# Patient Record
Sex: FEMALE | Race: BLACK OR AFRICAN AMERICAN | HISPANIC OR LATINO | URBAN - METROPOLITAN AREA
[De-identification: names, ages, dates, MRNs, and addresses within clinical notes are randomized per-mention and may not be internally consistent; named-entity substitution may affect disease eponyms.]

---

## 2017-06-15 ENCOUNTER — INPATIENT (INPATIENT)
Facility: HOSPITAL | Age: 27
LOS: 1 days | Discharge: HOME | End: 2017-06-17
Attending: OBSTETRICS & GYNECOLOGY | Admitting: OBSTETRICS & GYNECOLOGY

## 2017-06-15 DIAGNOSIS — O99.89 OTHER SPECIFIED DISEASES AND CONDITIONS COMPLICATING PREGNANCY, CHILDBIRTH AND THE PUERPERIUM: ICD-10-CM

## 2017-06-28 DIAGNOSIS — D64.9 ANEMIA, UNSPECIFIED: ICD-10-CM

## 2017-06-28 DIAGNOSIS — Z33.1 PREGNANT STATE, INCIDENTAL: ICD-10-CM

## 2017-06-28 DIAGNOSIS — J45.909 UNSPECIFIED ASTHMA, UNCOMPLICATED: ICD-10-CM

## 2017-06-28 DIAGNOSIS — Z3A.39 39 WEEKS GESTATION OF PREGNANCY: ICD-10-CM

## 2019-02-25 ENCOUNTER — APPOINTMENT (OUTPATIENT)
Dept: OTOLARYNGOLOGY | Facility: CLINIC | Age: 29
End: 2019-02-25

## 2019-05-06 ENCOUNTER — EMERGENCY (EMERGENCY)
Facility: HOSPITAL | Age: 29
LOS: 0 days | Discharge: HOME | End: 2019-05-06
Attending: EMERGENCY MEDICINE | Admitting: EMERGENCY MEDICINE
Payer: MEDICAID

## 2019-05-06 VITALS
HEART RATE: 95 BPM | TEMPERATURE: 97 F | SYSTOLIC BLOOD PRESSURE: 150 MMHG | RESPIRATION RATE: 18 BRPM | OXYGEN SATURATION: 100 % | DIASTOLIC BLOOD PRESSURE: 83 MMHG

## 2019-05-06 DIAGNOSIS — Z91.018 ALLERGY TO OTHER FOODS: ICD-10-CM

## 2019-05-06 DIAGNOSIS — R51 HEADACHE: ICD-10-CM

## 2019-05-06 DIAGNOSIS — Z91.013 ALLERGY TO SEAFOOD: ICD-10-CM

## 2019-05-06 PROCEDURE — 70450 CT HEAD/BRAIN W/O DYE: CPT | Mod: 26

## 2019-05-06 PROCEDURE — 99284 EMERGENCY DEPT VISIT MOD MDM: CPT

## 2019-05-06 RX ORDER — METOCLOPRAMIDE HCL 10 MG
10 TABLET ORAL ONCE
Qty: 0 | Refills: 0 | Status: COMPLETED | OUTPATIENT
Start: 2019-05-06 | End: 2019-05-06

## 2019-05-06 RX ORDER — ACETAMINOPHEN 500 MG
650 TABLET ORAL ONCE
Qty: 0 | Refills: 0 | Status: COMPLETED | OUTPATIENT
Start: 2019-05-06 | End: 2019-05-06

## 2019-05-06 RX ADMIN — Medication 650 MILLIGRAM(S): at 18:49

## 2019-05-06 RX ADMIN — Medication 10 MILLIGRAM(S): at 18:50

## 2019-05-06 NOTE — ED PROVIDER NOTE - PROGRESS NOTE DETAILS
Patient states she is feeling significantly better after medication. Still awaiting head CT. HA Resolved. CT neg . f/u with neuro I personally evaluated the patient. I reviewed the Physician Assistant Fellow's note and agree with the findings and plan.

## 2019-05-06 NOTE — ED PROVIDER NOTE - CARE PROVIDER_API CALL
Jesse Zhang)  EEGEpilepsy; Neurology  [address] [place] [address], [address]  [place] [address]  Phone: [phone]  Fax: [phone]  Follow Up Time:

## 2019-05-06 NOTE — ED ADULT NURSE NOTE - NSIMPLEMENTINTERV_GEN_ALL_ED
Implemented All Universal Safety Interventions:  [place] to call system. Call bell, personal items and telephone within reach. Instruct patient to call for assistance. Room bathroom lighting operational. Non-slip footwear when patient is off stretcher. Physically safe environment: no spills, clutter or unnecessary equipment. Stretcher in lowest position, wheels locked, appropriate side rails in place.
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
## 2019-05-06 NOTE — ED PROVIDER NOTE - PHYSICAL EXAMINATION
Physical Exam    Vital Signs: I have reviewed the initial vital signs  Constitutional: well-nourished, appears stated age, no acute distress  Head: Normocephalic, atraumatic.  EENT: Conjunctiva pink, Sclera clear, PERRLA, EOMI, painless, slight horizontal nystagmus noted.  Cardiovascular: S1 and S2 present, regular rate, regular rhythm. Well perfused extremities, no peripheral edema  Respiratory: no audible wheezing  Musculoskeletal: supple nontender neck, no midline tenderness, no joint pain  Integumentary: warm, dry, no rash  Neurologic: A & O x 3, CN II-XII grossly intact, all extremities’ motor and sensory functions grossly intact. Normal gait, no ataxia  Psychiatric: appropriate mood, appropriate affect

## 2019-05-06 NOTE — ED PROVIDER NOTE - CLINICAL SUMMARY MEDICAL DECISION MAKING FREE TEXT BOX
pw headache. Imaging negative and pain controlled in ED. Patient to be discharged from ED. Any available test results were discussed with patient and/or family. Verbal instructions given, including instructions to return to ED immediately for any new, worsening, or concerning symptoms. Patient endorsed understanding. Written discharge instructions additionally given, including follow-up plan with neurologist and PCP.

## 2019-05-06 NOTE — ED ADULT NURSE NOTE - ED STAT RN HANDOFF DETAILS
pt denies discomfort, verbalized understanding discharge instructions and willingness for DC. iv removed. VSS.

## 2019-05-06 NOTE — ED PROVIDER NOTE - NS ED ROS FT
Review of Systems         Constitutional: (-) fever (-) chills (-) weakness       Head: (-) trauma       EENT: (-) visual changes       Cardiovascular: (-) chest pain       Respiratory: (-) cough, (-) shortness of breath       Gastrointestinal: (-) abdominal pain (-) vomiting (-) diarrhea (-) nausea (-) constipation       Genitourinary: (-) dysuria       Musculoskeletal: (-) neck pain (-) back pain (-) joint pain       Integumentary: (-) rash       Neurological: (+) headache (-) altered mental status (-) dizziness (-) paresthesias       Psych: (-) psych history

## 2019-05-06 NOTE — ED ADULT NURSE REASSESSMENT NOTE - NS ED NURSE REASSESS COMMENT FT1
pt co pain above right eye. no distress noted at this time. iv intact, safety maintained, VSS. awaiting additional testing.

## 2019-05-06 NOTE — ED PROVIDER NOTE - OBJECTIVE STATEMENT
28 year old female hx of migraines presenting with 3 days of migraine. Not maximal at onset, worsening. Bitemporal and over left eye. No palliative measures, has not taken any medications but Excedrin usually works. No nausea/vomiting/visual changes. Does admit to intermittent dizziness. She is sent in by her PCP for eval. Denies weakness, numbness, paresthesias. + photophobia. No fevers, chills, neck pain, shortness of breath, cough. Denies any head trauma. Patient does have history several years ago of head injury.

## 2019-05-06 NOTE — ED PROVIDER NOTE - CARE PROVIDERS DIRECT ADDRESSES
[email or web address]

## 2019-06-06 ENCOUNTER — LABORATORY RESULT (OUTPATIENT)
Age: 29
End: 2019-06-06

## 2019-06-06 ENCOUNTER — OUTPATIENT (OUTPATIENT)
Dept: OUTPATIENT SERVICES | Facility: HOSPITAL | Age: 29
LOS: 1 days | Discharge: HOME | End: 2019-06-06

## 2019-06-06 ENCOUNTER — APPOINTMENT (OUTPATIENT)
Dept: RHEUMATOLOGY | Facility: CLINIC | Age: 29
End: 2019-06-06

## 2019-06-06 VITALS
HEART RATE: 92 BPM | TEMPERATURE: 96.7 F | WEIGHT: 205 LBS | HEIGHT: 69 IN | BODY MASS INDEX: 30.36 KG/M2 | SYSTOLIC BLOOD PRESSURE: 149 MMHG | DIASTOLIC BLOOD PRESSURE: 87 MMHG

## 2019-06-06 DIAGNOSIS — R51 HEADACHE: ICD-10-CM

## 2019-06-06 DIAGNOSIS — R63.5 ABNORMAL WEIGHT GAIN: ICD-10-CM

## 2019-06-06 RX ORDER — PANTOPRAZOLE SODIUM 40 MG/1
40 TABLET, DELAYED RELEASE ORAL
Refills: 0 | Status: ACTIVE | COMMUNITY
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
## 2019-06-06 NOTE — ASSESSMENT
[FreeTextEntry1] : 29F w PMHx of scoliosis, migraines, vertigo, asthma and food allergies (shellfish, tomato, mushrooms, chocolate, cats with anaphylaxis) referred by PMD for workup of GCA and temporal artery biopsy. \par \par Headache with vision changes: Doubt GCA given age and only minimally elevated ESR (33) CRP (1.7), MAGNOLIA neg, Factor XII slightly elevated\par - slow taper of prednisone now\par - F/u with neurology and PMD\par - Ophthalmology referral: has only seen optometrist\par - Patient has carotid duplex, EMG already scheduled

## 2019-06-06 NOTE — REVIEW OF SYSTEMS
[Recent Weight Gain (___ Lbs)] : recent [unfilled] ~Ulb weight gain [Negative] : Psychiatric [As Noted in HPI] : as noted in HPI [Confused] : no confusion [Dizziness] : no dizziness [Convulsions] : no convulsions [Fainting] : no fainting [Limb Weakness] : no limb weakness [FreeTextEntry2] : 40-50 lbs in 4-5 months [FreeTextEntry4] : Denies jaw claudication

## 2019-06-06 NOTE — PHYSICAL EXAM
[General Appearance - Alert] : alert [General Appearance - In No Acute Distress] : in no acute distress [General Appearance - Well Nourished] : well nourished [Sclera] : the sclera and conjunctiva were normal [Extraocular Movements] : extraocular movements were intact [Outer Ear] : the ears and nose were normal in appearance [Neck Appearance] : the appearance of the neck was normal [] : no respiratory distress [Respiration, Rhythm And Depth] : normal respiratory rhythm and effort [Heart Rate And Rhythm] : heart rate was normal and rhythm regular [Abdomen Soft] : soft [Abdomen Tenderness] : non-tender [No CVA Tenderness] : no ~M costovertebral angle tenderness [Involuntary Movements] : no involuntary movements were seen [Skin Color & Pigmentation] : normal skin color and pigmentation [No Focal Deficits] : no focal deficits [Musculoskeletal - Swelling] : no joint swelling seen [Oriented To Time, Place, And Person] : oriented to person, place, and time [FreeTextEntry1] : NO scalp tenderness
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
## 2019-07-18 ENCOUNTER — APPOINTMENT (OUTPATIENT)
Dept: RHEUMATOLOGY | Facility: CLINIC | Age: 29
End: 2019-07-18

## 2019-08-01 LAB
ACE BLD-CCNC: 23 U/L
ALBUMIN SERPL ELPH-MCNC: 4.3 G/DL
ALP BLD-CCNC: 77 U/L
ALT SERPL-CCNC: 9 U/L
ANION GAP SERPL CALC-SCNC: 11 MMOL/L
AST SERPL-CCNC: 6 U/L
BASOPHILS # BLD AUTO: 0 K/UL
BASOPHILS NFR BLD AUTO: 0 %
BILIRUB SERPL-MCNC: 0.3 MG/DL
BUN SERPL-MCNC: 12 MG/DL
CALCIUM SERPL-MCNC: 9.3 MG/DL
CHLORIDE SERPL-SCNC: 104 MMOL/L
CO2 SERPL-SCNC: 27 MMOL/L
CREAT SERPL-MCNC: 0.8 MG/DL
CRP SERPL-MCNC: 0.64 MG/DL
EOSINOPHIL # BLD AUTO: 0.18 K/UL
EOSINOPHIL NFR BLD AUTO: 1 %
ERYTHROCYTE [SEDIMENTATION RATE] IN BLOOD BY WESTERGREN METHOD: 18 MM/HR
GLUCOSE SERPL-MCNC: 81 MG/DL
HCT VFR BLD CALC: 34.8 %
HGB BLD-MCNC: 11.3 G/DL
LYMPHOCYTES # BLD AUTO: 6.59 K/UL
LYMPHOCYTES NFR BLD AUTO: 36 %
MAN DIFF?: NORMAL
MCHC RBC-ENTMCNC: 27.3 PG
MCHC RBC-ENTMCNC: 32.5 G/DL
MCV RBC AUTO: 84.1 FL
MONOCYTES # BLD AUTO: 0.55 K/UL
MONOCYTES NFR BLD AUTO: 3 %
NEUTROPHILS # BLD AUTO: 10.99 K/UL
NEUTROPHILS NFR BLD AUTO: 59 %
PLATELET # BLD AUTO: 377 K/UL
POTASSIUM SERPL-SCNC: 4.4 MMOL/L
PROT SERPL-MCNC: 6.9 G/DL
RBC # BLD: 4.14 M/UL
RBC # FLD: 16.7 %
SODIUM SERPL-SCNC: 142 MMOL/L
T3 SERPL-MCNC: 105 NG/DL
TSH SERPL-ACNC: 1.7 UIU/ML
WBC # FLD AUTO: 18.31 K/UL

## 2020-10-27 ENCOUNTER — OUTPATIENT (OUTPATIENT)
Dept: OUTPATIENT SERVICES | Facility: HOSPITAL | Age: 30
LOS: 1 days | Discharge: HOME | End: 2020-10-27

## 2020-10-27 VITALS — SYSTOLIC BLOOD PRESSURE: 129 MMHG | DIASTOLIC BLOOD PRESSURE: 71 MMHG | HEART RATE: 107 BPM

## 2020-10-27 VITALS — HEART RATE: 109 BPM | DIASTOLIC BLOOD PRESSURE: 83 MMHG | SYSTOLIC BLOOD PRESSURE: 139 MMHG

## 2020-10-27 LAB
ALBUMIN SERPL ELPH-MCNC: 3.7 G/DL — SIGNIFICANT CHANGE UP (ref 3.5–5.2)
ALP SERPL-CCNC: 87 U/L — SIGNIFICANT CHANGE UP (ref 30–115)
ALT FLD-CCNC: 10 U/L — SIGNIFICANT CHANGE UP (ref 0–41)
ANION GAP SERPL CALC-SCNC: 10 MMOL/L — SIGNIFICANT CHANGE UP (ref 7–14)
APPEARANCE UR: CLEAR — SIGNIFICANT CHANGE UP
AST SERPL-CCNC: 13 U/L — SIGNIFICANT CHANGE UP (ref 0–41)
BACTERIA # UR AUTO: NEGATIVE — SIGNIFICANT CHANGE UP
BASOPHILS # BLD AUTO: 0.03 K/UL — SIGNIFICANT CHANGE UP (ref 0–0.2)
BASOPHILS NFR BLD AUTO: 0.3 % — SIGNIFICANT CHANGE UP (ref 0–1)
BILIRUB SERPL-MCNC: 0.2 MG/DL — SIGNIFICANT CHANGE UP (ref 0.2–1.2)
BILIRUB UR-MCNC: NEGATIVE — SIGNIFICANT CHANGE UP
BUN SERPL-MCNC: 5 MG/DL — LOW (ref 10–20)
CALCIUM SERPL-MCNC: 8.9 MG/DL — SIGNIFICANT CHANGE UP (ref 8.5–10.1)
CHLORIDE SERPL-SCNC: 105 MMOL/L — SIGNIFICANT CHANGE UP (ref 98–110)
CO2 SERPL-SCNC: 21 MMOL/L — SIGNIFICANT CHANGE UP (ref 17–32)
COLOR SPEC: YELLOW — SIGNIFICANT CHANGE UP
CREAT ?TM UR-MCNC: 196 MG/DL — SIGNIFICANT CHANGE UP
CREAT SERPL-MCNC: 0.6 MG/DL — LOW (ref 0.7–1.5)
DIFF PNL FLD: NEGATIVE — SIGNIFICANT CHANGE UP
EOSINOPHIL # BLD AUTO: 0.12 K/UL — SIGNIFICANT CHANGE UP (ref 0–0.7)
EOSINOPHIL NFR BLD AUTO: 1.1 % — SIGNIFICANT CHANGE UP (ref 0–8)
EPI CELLS # UR: 5 /HPF — SIGNIFICANT CHANGE UP (ref 0–5)
GLUCOSE SERPL-MCNC: 101 MG/DL — HIGH (ref 70–99)
GLUCOSE UR QL: NEGATIVE — SIGNIFICANT CHANGE UP
HCT VFR BLD CALC: 33.1 % — LOW (ref 37–47)
HGB BLD-MCNC: 10.7 G/DL — LOW (ref 12–16)
HYALINE CASTS # UR AUTO: 3 /LPF — SIGNIFICANT CHANGE UP (ref 0–7)
IMM GRANULOCYTES NFR BLD AUTO: 0.5 % — HIGH (ref 0.1–0.3)
KETONES UR-MCNC: NEGATIVE — SIGNIFICANT CHANGE UP
LDH SERPL L TO P-CCNC: 189 — SIGNIFICANT CHANGE UP (ref 50–242)
LEUKOCYTE ESTERASE UR-ACNC: ABNORMAL
LYMPHOCYTES # BLD AUTO: 2.38 K/UL — SIGNIFICANT CHANGE UP (ref 1.2–3.4)
LYMPHOCYTES # BLD AUTO: 21.8 % — SIGNIFICANT CHANGE UP (ref 20.5–51.1)
MCHC RBC-ENTMCNC: 29 PG — SIGNIFICANT CHANGE UP (ref 27–31)
MCHC RBC-ENTMCNC: 32.3 G/DL — SIGNIFICANT CHANGE UP (ref 32–37)
MCV RBC AUTO: 89.7 FL — SIGNIFICANT CHANGE UP (ref 81–99)
MONOCYTES # BLD AUTO: 0.65 K/UL — HIGH (ref 0.1–0.6)
MONOCYTES NFR BLD AUTO: 6 % — SIGNIFICANT CHANGE UP (ref 1.7–9.3)
NEUTROPHILS # BLD AUTO: 7.68 K/UL — HIGH (ref 1.4–6.5)
NEUTROPHILS NFR BLD AUTO: 70.3 % — SIGNIFICANT CHANGE UP (ref 42.2–75.2)
NITRITE UR-MCNC: NEGATIVE — SIGNIFICANT CHANGE UP
NRBC # BLD: 0 /100 WBCS — SIGNIFICANT CHANGE UP (ref 0–0)
PH UR: 6.5 — SIGNIFICANT CHANGE UP (ref 5–8)
PLATELET # BLD AUTO: 316 K/UL — SIGNIFICANT CHANGE UP (ref 130–400)
POTASSIUM SERPL-MCNC: 4 MMOL/L — SIGNIFICANT CHANGE UP (ref 3.5–5)
POTASSIUM SERPL-SCNC: 4 MMOL/L — SIGNIFICANT CHANGE UP (ref 3.5–5)
PROT ?TM UR-MCNC: 21 MG/DLG/24H — SIGNIFICANT CHANGE UP
PROT SERPL-MCNC: 6.5 G/DL — SIGNIFICANT CHANGE UP (ref 6–8)
PROT UR-MCNC: SIGNIFICANT CHANGE UP
PROT/CREAT UR-RTO: 0.1 RATIO — SIGNIFICANT CHANGE UP (ref 0–0.2)
RBC # BLD: 3.69 M/UL — LOW (ref 4.2–5.4)
RBC # FLD: 14.7 % — HIGH (ref 11.5–14.5)
RBC CASTS # UR COMP ASSIST: 4 /HPF — SIGNIFICANT CHANGE UP (ref 0–4)
SODIUM SERPL-SCNC: 136 MMOL/L — SIGNIFICANT CHANGE UP (ref 135–146)
SP GR SPEC: 1.02 — SIGNIFICANT CHANGE UP (ref 1.01–1.03)
URATE SERPL-MCNC: 3 MG/DL — SIGNIFICANT CHANGE UP (ref 2.5–7)
UROBILINOGEN FLD QL: ABNORMAL
WBC # BLD: 10.91 K/UL — HIGH (ref 4.8–10.8)
WBC # FLD AUTO: 10.91 K/UL — HIGH (ref 4.8–10.8)
WBC UR QL: 5 /HPF — SIGNIFICANT CHANGE UP (ref 0–5)

## 2020-10-27 NOTE — OB PROVIDER TRIAGE NOTE - NSHPPHYSICALEXAM_GEN_ALL_CORE
Vital Signs Last 24 Hrs  T(C): 36.9 (27 Oct 2020 11:29), Max: 36.9 (27 Oct 2020 11:29)  T(F): 98.4 (27 Oct 2020 11:29), Max: 98.4 (27 Oct 2020 11:29)  HR: 108 (27 Oct 2020 11:53) (108 - 113)  BP: 142/90 (27 Oct 2020 11:53) (139/83 - 148/92)  RR: 18 (27 Oct 2020 11:29) (18 - 18)    Gen: AOx3, no acute distress  CVS: RRR  Lungs: CTABL  Abd: soft, gravid, non tender, no palpable contractions  Neuro: reflexes 2+ in UE bilaterally  Ext: No edema bilaterally    EFM: 145/mod/+accels; cat 1  [place]: none  SVE: /  /   vertex intact @  Speculum:  BSS: Vital Signs Last 24 Hrs  T(C): 36.9 (27 Oct 2020 11:29), Max: 36.9 (27 Oct 2020 11:29)  T(F): 98.4 (27 Oct 2020 11:29), Max: 98.4 (27 Oct 2020 11:29)  HR: 108 (27 Oct 2020 11:53) (108 - 113)  BP: 142/90 (27 Oct 2020 11:53) (139/83 - 148/92)  RR: 18 (27 Oct 2020 11:29) (18 - 18)    Gen: AOx3, no acute distress  CVS: RRR  Lungs: CTABL  Abd: soft, gravid, non tender, no palpable contractions  Neuro: reflexes 2+ in UE bilaterally  Ext: No edema bilaterally    EFM: 145/mod/+accels; cat 1  [place]: none  SVE: C/L/P  Speculum: cervix appears closed, no lesions, no discharge   BSS: cephalic, ant placenta, CL 3.67cm, BPP 8/8, MVP 7.12cm, EFW 870g (61.2%)

## 2020-10-27 NOTE — OB PROVIDER TRIAGE NOTE - NS_OBGYNHISTORY_OBGYN_ALL_OB_FT
Obhx: FT NSVDx3, largest 4xme0or  P3: hyperemesis gravidarum    Gynhx: Denies h/o fibroids, cysts, abnormal paps, or STIs

## 2020-10-27 NOTE — OB PROVIDER TRIAGE NOTE - ADDITIONAL INSTRUCTIONS
If you have headache, blurry vision, chest pain, difficulty breathing, vaginal bleeding, decreased fetal movement, or severe abdominal pain, call the doctor or return to the hospital.

## 2020-10-27 NOTE — OB PROVIDER TRIAGE NOTE - HISTORY OF PRESENT ILLNESS
31yo  at 25w1d EDC 2021 by first trimester morena presents to L&D for blood pressure monitoring. Reports new onset swelling in her hands and feet for the last 3 days. Also reports sudden nose bleed yesterday, now resolved, and intermittent, sharp, lower abdominal pain, for the last week, now 0/10 in intensity. Denies fever, chills, CP, SOB, HA, blurry vision, abdominal pain, VB, LOF, or urinary symptoms. Denies any complications with this pregnancy. H/o migraines, was previously taking sumatriptan PRN however stopped during pregnancy. H/o mild intermittent asthma, uses albuterol inhaler 1x weekly, no h/o hospitalizations or intubations. GBS unknown. 29yo  at 25w1d EDC 2021 by first trimester sono presents to L&D for blood pressure monitoring. Reports new onset swelling in her hands and feet for the last 3 days. Also reports sudden nose bleed yesterday, now resolved, and intermittent, sharp, lower abdominal pain, for the last week, now 0/10 in intensity. Denies fever, chills, CP, SOB, HA, blurry vision, abdominal pain, VB, LOF, or urinary symptoms. Good fetal movement. Last PO intake at 9am, last intercourse a few days ago, last BM this morning. Denies any complications with this pregnancy. H/o migraines, was previously taking sumatriptan PRN however stopped during pregnancy. H/o mild intermittent asthma, uses albuterol inhaler 1x weekly, no h/o hospitalizations or intubations. GBS unknown.

## 2020-10-27 NOTE — OB PROVIDER TRIAGE NOTE - NSOBPROVIDERNOTE_OBGYN_ALL_OB_FT
A/P: 31yo  at 25w1d, for BP monitoring, r/o gHTN vs preeclampsia  - cont EFM and toco  - BPs q15min  - f/u PELs, Uprcr  - continue to observe    Dr. Tavera and Dr. Johnston to be made aware. A/P: 31yo  at 25w1d, not in  labor, for BP monitoring, r/o gHTN vs preeclampsia  - cont EFM and toco  - BPs q15min  - f/u PELs, Uprcr, Ucx  - continue to observe    Dr. Tavera and Dr. Johnston to be made aware. A/P: 31yo  at 25w1d, not in  labor, for BP monitoring, r/o gHTN vs preeclampsia  - cont EFM and toco  - BPs q15min  - f/u PELs, Uprcr, Ucx  - continue to observe    Dr. Tavera and Dr. Johnston aware.    ADDENDUM: Patient has had 4 hours of normal blood pressures, no criteria met. Currently asymptomatic, PELs within normal limits. Will discharge home with preeclamptic and PTL precautions, has appointment with PMD on 10/30.     Dr. Tavera and Dr. Johnston aware.

## 2020-10-28 LAB
CULTURE RESULTS: SIGNIFICANT CHANGE UP
SPECIMEN SOURCE: SIGNIFICANT CHANGE UP

## 2020-10-29 LAB
GROUP B BETA STREP DNA (PCR): DETECTED
GROUP B BETA STREP INTERPRETATION: SIGNIFICANT CHANGE UP
SOURCE GROUP B STREP: SIGNIFICANT CHANGE UP

## 2020-11-01 LAB
A VAGINAE DNA VAG QL NAA+PROBE: SIGNIFICANT CHANGE UP
BVAB2 DNA VAG QL NAA+PROBE: SIGNIFICANT CHANGE UP
C ALBICANS DNA VAG QL NAA+PROBE: POSITIVE
C GLABRATA DNA VAG QL NAA+PROBE: NEGATIVE — SIGNIFICANT CHANGE UP
C KRUSEI DNA VAG QL NAA+PROBE: NEGATIVE — SIGNIFICANT CHANGE UP
C LUSITANIAE DNA VAG QL NAA+PROBE: NEGATIVE — SIGNIFICANT CHANGE UP
C TRACH RRNA SPEC QL NAA+PROBE: NEGATIVE — SIGNIFICANT CHANGE UP
MEGA1 DNA VAG QL NAA+PROBE: SIGNIFICANT CHANGE UP
N GONORRHOEA RRNA SPEC QL NAA+PROBE: NEGATIVE — SIGNIFICANT CHANGE UP
T VAGINALIS RRNA SPEC QL NAA+PROBE: NEGATIVE — SIGNIFICANT CHANGE UP

## 2021-01-01 ENCOUNTER — OUTPATIENT (OUTPATIENT)
Dept: OUTPATIENT SERVICES | Facility: HOSPITAL | Age: 31
LOS: 1 days | End: 2021-01-01
Payer: MEDICAID

## 2021-01-01 PROCEDURE — G9005: CPT

## 2021-01-23 ENCOUNTER — INPATIENT (INPATIENT)
Facility: HOSPITAL | Age: 31
LOS: 3 days | Discharge: HOME | End: 2021-01-27
Attending: OBSTETRICS & GYNECOLOGY | Admitting: OBSTETRICS & GYNECOLOGY
Payer: MEDICAID

## 2021-01-23 VITALS — HEART RATE: 120 BPM | DIASTOLIC BLOOD PRESSURE: 92 MMHG | SYSTOLIC BLOOD PRESSURE: 132 MMHG

## 2021-01-23 PROBLEM — G43.909 MIGRAINE, UNSPECIFIED, NOT INTRACTABLE, WITHOUT STATUS MIGRAINOSUS: Chronic | Status: ACTIVE | Noted: 2020-10-27

## 2021-01-23 LAB
ALBUMIN SERPL ELPH-MCNC: 3.4 G/DL — LOW (ref 3.5–5.2)
ALP SERPL-CCNC: 141 U/L — HIGH (ref 30–115)
ALT FLD-CCNC: 6 U/L — SIGNIFICANT CHANGE UP (ref 0–41)
AMPHET UR-MCNC: NEGATIVE — SIGNIFICANT CHANGE UP
ANION GAP SERPL CALC-SCNC: 10 MMOL/L — SIGNIFICANT CHANGE UP (ref 7–14)
APPEARANCE UR: CLEAR — SIGNIFICANT CHANGE UP
AST SERPL-CCNC: 11 U/L — SIGNIFICANT CHANGE UP (ref 0–41)
BARBITURATES UR SCN-MCNC: NEGATIVE — SIGNIFICANT CHANGE UP
BASOPHILS # BLD AUTO: 0.01 K/UL — SIGNIFICANT CHANGE UP (ref 0–0.2)
BASOPHILS NFR BLD AUTO: 0.2 % — SIGNIFICANT CHANGE UP (ref 0–1)
BENZODIAZ UR-MCNC: NEGATIVE — SIGNIFICANT CHANGE UP
BILIRUB SERPL-MCNC: <0.2 MG/DL — SIGNIFICANT CHANGE UP (ref 0.2–1.2)
BILIRUB UR-MCNC: NEGATIVE — SIGNIFICANT CHANGE UP
BLD GP AB SCN SERPL QL: SIGNIFICANT CHANGE UP
BUN SERPL-MCNC: 5 MG/DL — LOW (ref 10–20)
BUPRENORPHINE SCREEN, URINE RESULT: NEGATIVE — SIGNIFICANT CHANGE UP
CALCIUM SERPL-MCNC: 8.2 MG/DL — LOW (ref 8.5–10.1)
CHLORIDE SERPL-SCNC: 106 MMOL/L — SIGNIFICANT CHANGE UP (ref 98–110)
CO2 SERPL-SCNC: 20 MMOL/L — SIGNIFICANT CHANGE UP (ref 17–32)
COCAINE METAB.OTHER UR-MCNC: NEGATIVE — SIGNIFICANT CHANGE UP
COLOR SPEC: SIGNIFICANT CHANGE UP
CREAT ?TM UR-MCNC: 70 MG/DL — SIGNIFICANT CHANGE UP
CREAT SERPL-MCNC: 0.6 MG/DL — LOW (ref 0.7–1.5)
DIFF PNL FLD: NEGATIVE — SIGNIFICANT CHANGE UP
EOSINOPHIL # BLD AUTO: 0.03 K/UL — SIGNIFICANT CHANGE UP (ref 0–0.7)
EOSINOPHIL NFR BLD AUTO: 0.5 % — SIGNIFICANT CHANGE UP (ref 0–8)
FENTANYL UR QL: NEGATIVE — SIGNIFICANT CHANGE UP
GLUCOSE SERPL-MCNC: 72 MG/DL — SIGNIFICANT CHANGE UP (ref 70–99)
GLUCOSE UR QL: NEGATIVE — SIGNIFICANT CHANGE UP
HCT VFR BLD CALC: 31.7 % — LOW (ref 37–47)
HGB BLD-MCNC: 10.1 G/DL — LOW (ref 12–16)
HIV 1 & 2 AB SERPL IA.RAPID: SIGNIFICANT CHANGE UP
IMM GRANULOCYTES NFR BLD AUTO: 0.5 % — HIGH (ref 0.1–0.3)
KETONES UR-MCNC: NEGATIVE — SIGNIFICANT CHANGE UP
L&D DRUG SCREEN, URINE: SIGNIFICANT CHANGE UP
LDH SERPL L TO P-CCNC: 162 — SIGNIFICANT CHANGE UP (ref 50–242)
LEUKOCYTE ESTERASE UR-ACNC: NEGATIVE — SIGNIFICANT CHANGE UP
LYMPHOCYTES # BLD AUTO: 1.51 K/UL — SIGNIFICANT CHANGE UP (ref 1.2–3.4)
LYMPHOCYTES # BLD AUTO: 24.5 % — SIGNIFICANT CHANGE UP (ref 20.5–51.1)
MCHC RBC-ENTMCNC: 27.7 PG — SIGNIFICANT CHANGE UP (ref 27–31)
MCHC RBC-ENTMCNC: 31.9 G/DL — LOW (ref 32–37)
MCV RBC AUTO: 87.1 FL — SIGNIFICANT CHANGE UP (ref 81–99)
METHADONE UR-MCNC: NEGATIVE — SIGNIFICANT CHANGE UP
MONOCYTES # BLD AUTO: 0.68 K/UL — HIGH (ref 0.1–0.6)
MONOCYTES NFR BLD AUTO: 11 % — HIGH (ref 1.7–9.3)
NEUTROPHILS # BLD AUTO: 3.9 K/UL — SIGNIFICANT CHANGE UP (ref 1.4–6.5)
NEUTROPHILS NFR BLD AUTO: 63.3 % — SIGNIFICANT CHANGE UP (ref 42.2–75.2)
NITRITE UR-MCNC: NEGATIVE — SIGNIFICANT CHANGE UP
NRBC # BLD: 0 /100 WBCS — SIGNIFICANT CHANGE UP (ref 0–0)
OPIATES UR-MCNC: NEGATIVE — SIGNIFICANT CHANGE UP
OXYCODONE UR-MCNC: NEGATIVE — SIGNIFICANT CHANGE UP
PCP UR-MCNC: NEGATIVE — SIGNIFICANT CHANGE UP
PH UR: 7 — SIGNIFICANT CHANGE UP (ref 5–8)
PLATELET # BLD AUTO: 226 K/UL — SIGNIFICANT CHANGE UP (ref 130–400)
POTASSIUM SERPL-MCNC: 3.8 MMOL/L — SIGNIFICANT CHANGE UP (ref 3.5–5)
POTASSIUM SERPL-SCNC: 3.8 MMOL/L — SIGNIFICANT CHANGE UP (ref 3.5–5)
PRENATAL SYPHILIS TEST: SIGNIFICANT CHANGE UP
PROPOXYPHENE QUALITATIVE URINE RESULT: NEGATIVE — SIGNIFICANT CHANGE UP
PROT ?TM UR-MCNC: 6 MG/DLG/24H — SIGNIFICANT CHANGE UP
PROT SERPL-MCNC: 6.1 G/DL — SIGNIFICANT CHANGE UP (ref 6–8)
PROT UR-MCNC: NEGATIVE — SIGNIFICANT CHANGE UP
PROT/CREAT UR-RTO: 0.1 RATIO — SIGNIFICANT CHANGE UP (ref 0–0.2)
RBC # BLD: 3.64 M/UL — LOW (ref 4.2–5.4)
RBC # FLD: 14.2 % — SIGNIFICANT CHANGE UP (ref 11.5–14.5)
SARS-COV-2 RNA SPEC QL NAA+PROBE: DETECTED
SODIUM SERPL-SCNC: 136 MMOL/L — SIGNIFICANT CHANGE UP (ref 135–146)
SP GR SPEC: 1.01 — SIGNIFICANT CHANGE UP (ref 1.01–1.03)
URATE SERPL-MCNC: 4.3 MG/DL — SIGNIFICANT CHANGE UP (ref 2.5–7)
UROBILINOGEN FLD QL: SIGNIFICANT CHANGE UP
WBC # BLD: 6.16 K/UL — SIGNIFICANT CHANGE UP (ref 4.8–10.8)
WBC # FLD AUTO: 6.16 K/UL — SIGNIFICANT CHANGE UP (ref 4.8–10.8)

## 2021-01-23 RX ORDER — DINOPROSTONE 10 MG/241MG
10 INSERT VAGINAL ONCE
Refills: 0 | Status: COMPLETED | OUTPATIENT
Start: 2021-01-23 | End: 2021-01-23

## 2021-01-23 RX ORDER — SODIUM CHLORIDE 9 MG/ML
1000 INJECTION, SOLUTION INTRAVENOUS
Refills: 0 | Status: DISCONTINUED | OUTPATIENT
Start: 2021-01-23 | End: 2021-01-24

## 2021-01-23 RX ORDER — LABETALOL HCL 100 MG
20 TABLET ORAL ONCE
Refills: 0 | Status: DISCONTINUED | OUTPATIENT
Start: 2021-01-23 | End: 2021-01-23

## 2021-01-23 RX ORDER — OXYTOCIN 10 UNIT/ML
333.33 VIAL (ML) INJECTION
Qty: 20 | Refills: 0 | Status: DISCONTINUED | OUTPATIENT
Start: 2021-01-23 | End: 2021-01-24

## 2021-01-23 RX ORDER — CITRIC ACID/SODIUM CITRATE 300-500 MG
30 SOLUTION, ORAL ORAL ONCE
Refills: 0 | Status: COMPLETED | OUTPATIENT
Start: 2021-01-23 | End: 2021-01-23

## 2021-01-23 RX ORDER — HYDRALAZINE HCL 50 MG
5 TABLET ORAL ONCE
Refills: 0 | Status: COMPLETED | OUTPATIENT
Start: 2021-01-23 | End: 2021-01-23

## 2021-01-23 RX ORDER — AMPICILLIN TRIHYDRATE 250 MG
2 CAPSULE ORAL ONCE
Refills: 0 | Status: COMPLETED | OUTPATIENT
Start: 2021-01-23 | End: 2021-01-23

## 2021-01-23 RX ORDER — BUTORPHANOL TARTRATE 2 MG/ML
2 INJECTION, SOLUTION INTRAMUSCULAR; INTRAVENOUS ONCE
Refills: 0 | Status: DISCONTINUED | OUTPATIENT
Start: 2021-01-23 | End: 2021-01-23

## 2021-01-23 RX ORDER — AMPICILLIN TRIHYDRATE 250 MG
1 CAPSULE ORAL EVERY 4 HOURS
Refills: 0 | Status: DISCONTINUED | OUTPATIENT
Start: 2021-01-23 | End: 2021-01-24

## 2021-01-23 RX ORDER — DIPHENHYDRAMINE HCL 50 MG
25 CAPSULE ORAL ONCE
Refills: 0 | Status: COMPLETED | OUTPATIENT
Start: 2021-01-23 | End: 2021-01-23

## 2021-01-23 RX ADMIN — DINOPROSTONE 10 MILLIGRAM(S): 10 INSERT VAGINAL at 15:00

## 2021-01-23 RX ADMIN — Medication 25 MILLIGRAM(S): at 21:50

## 2021-01-23 RX ADMIN — Medication 216 GRAM(S): at 15:55

## 2021-01-23 RX ADMIN — BUTORPHANOL TARTRATE 2 MILLIGRAM(S): 2 INJECTION, SOLUTION INTRAMUSCULAR; INTRAVENOUS at 21:50

## 2021-01-23 RX ADMIN — Medication 30 MILLILITER(S): at 22:05

## 2021-01-23 RX ADMIN — Medication 108 GRAM(S): at 20:00

## 2021-01-23 NOTE — OB PROVIDER H&P - NSHPPHYSICALEXAM_GEN_ALL_CORE
Physical exam:    Vital Signs Last 24 Hrs  T(F): 97.9 (23 Jan 2021 13:34), Max: 97.9 (23 Jan 2021 11:12)  HR: 78 (23 Jan 2021 13:55) (70 - 120)  BP: 125/75 (23 Jan 2021 13:53) (121/70 - 146/100)  RR: 18 (23 Jan 2021 13:34) (18 - 18)  SpO2: 99% (23 Jan 2021 13:55) (96% - 100%)    GA: AOX3, no apparent distress  Resp: CTAB  Cardio: RRR  Neuro: 2+ biceps DTR bilaterally   Abd: soft, nontender, no palpable ctx, gravid  Extr: no erythema or pain to palpation bilaterally; no pitting edema noted bilaterally     EFM: 140/mod/+accels/2min deceleration with armando of 50bpm, recovered to baseline with resuscitation.  [place]: irregular contractions  SVE:

## 2021-01-23 NOTE — OB PROVIDER H&P - NS_OBGYNHISTORY_OBGYN_ALL_OB_FT
OB Hx:   P1) 2008, FTNSVD, 6-14, F, [facility] in [place], [place]  P2) 2013, FTNSVD, 7-6, F, [facility]   P3) 2017, FT , 6-9, [facility]; complicated by hyperemesis gravidarum  P4) current complicated by hyperemesis gravidarum       Gyn Hx: Denies h/o abnormal pap, STI, ovarian cysts, or fibroids

## 2021-01-23 NOTE — OB PROVIDER H&P - HISTORY OF PRESENT ILLNESS
29yo  at 37w5d GA dated by first trimester ultrasound admitted now for IOL for gestational hypertension. She had presented to L&D with two days duration of decreased fetal movement. Reports drinking something cold, eating something sweet, and lying on her side. Reports irregular contractions. Denies vaginal bleeding or leakage of fluid. GBS positive.   Pt tested positive for COVID-19 on 2021. Reports nasal congestion and fever two nights ago. Reports she has been in quarantine for approximately 5 weeks as both her  and one of her daughters tested positive for COVID-19.  Previously seen in triage in October for elevated blood pressures, underwent prolonged monitoring and did not meet criteria. Denies issues with blood pressures between pregnancies. Reports intermittent temporal headaches. Denies vision changes, chest pain, shortness of breath, RUQ or epigastric pain, or sudden onset of lower extremity swelling.   H/o migraines, was previously taking sumatriptan PRN however stopped during pregnancy. H/o mild intermittent asthma, uses albuterol inhaler 1x weekly, no h/o hospitalizations or intubations.

## 2021-01-23 NOTE — OB PROVIDER TRIAGE NOTE - NSOBPROVIDERNOTE_OBGYN_ALL_OB_FT
A/P: 29yo  at 37w5d GA, Rh pos, GBS pos, symptomatic COVID-19 viral syndrome, with decreased fetal movement with reassuring maternal and fetal wellbeing with BPP-8/8, with elevated blood pressures r/o gHTN vs pre-eclampsia   -continous efm and toco   -BPs [id number]  -CBC, CMP, UA, LDH, uric acid, Upr/cr      Dr. Bourne aware. Dr. Johnston to be made aware A/P: 31yo  at 37w5d GA, GBS pos, symptomatic COVID-19 viral syndrome, with decreased fetal movement with reassuring maternal and fetal wellbeing with BPP-8/8, with elevated blood pressures r/o gHTN vs pre-eclampsia     -continuos efm and toco   -BPs [id number]  -CBC, CMP, UA, LDH, uric acid, Upr/cr      Dr. Bourne aware. Dr. Johnston to be made aware

## 2021-01-23 NOTE — OB PROVIDER TRIAGE NOTE - NS_OBGYNHISTORY_OBGYN_ALL_OB_FT
P1) 2008, FTNSVd, 6-14, F, [facility] in [place], [place]  P2) 2013, FTNDVD, 7-6, F, [facility]   P3) 2017, FT , 6-9, [facility]; complicated by hyperemesis gravidarum  P4) current complicated by hyperemesis gravidarum       Gyn Hx: OB Hx:   P1) 2008, FTNSVD, 6-14, F, [facility] in [place], [place]  P2) 2013, FTNSVD, 7-6, F, [facility]   P3) 2017, FT , 6-9, [facility]; complicated by hyperemesis gravidarum  P4) current complicated by hyperemesis gravidarum       Gyn Hx: Denies h/o abnormal pap, STI, ovarian cysts, or fibroids

## 2021-01-23 NOTE — OB PROVIDER TRIAGE NOTE - NSHPPHYSICALEXAM_GEN_ALL_CORE
Vital Signs Last 24 Hrs  HR: 82 (23 Jan 2021 10:08) (82 - 120)  BP: 140/90 (23 Jan 2021 10:08) (132/92 - 143/99)    Physical Exam:   GA: AOX3, no apparent distress  Resp: CTAB  Cardio: RRR  Abd: soft, nontender, no palpable ctx, gravid  Extr: no erythema or pain to palpation bilaterally   SVE: Deferred    EFM: 150bpm/moderate variability  [place]: irregular  BSS: vtx, anterior placenta, MVP-5.47cm, BPP-8/8 Physical Exam:   GA: AOX3, no apparent distress  Resp: CTAB  Cardio: RRR  Abd: soft, nontender, no palpable ctx, gravid  Extr: no erythema or pain to palpation bilaterally   SVE: Deferred    EFM: 150bpm/moderate variability  [place]: irregular  BSS: vtx, anterior placenta, MVP-5.47cm, BPP-8/8 Vital Signs Last 24 Hrs    Physical Exam:   GA: AOX3, no apparent distress  Resp: CTAB  Cardio: RRR  Neuro: 2+ biceps DTR bilaterally   Abd: soft, nontender, no palpable ctx, gravid  Extr: no erythema or pain to palpation bilaterally; no pitting edema noted bilaterally   SVE: Deferred    EFM: 150bpm/moderate variability  [place]: irregular  BSS: vtx, anterior placenta, MVP-5.47cm, BPP-8/8

## 2021-01-23 NOTE — PROGRESS NOTE ADULT - ASSESSMENT
29yo  at 37w5d, GBS pos, symptomatic COVID-19 viral syndrome, gHTN r/o preeclampsia, s/p cervidil for IOL, currently with category II tracing.    - Cont EFM/[place]  - IV Hydration  - FHR resuscitation with IV fluid bolus  - Pain Management prn  - Monitor vitals  - Clear Liquids    Dr. Johnston and Dr. Bourne aware
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
## 2021-01-23 NOTE — OB PROVIDER TRIAGE NOTE - NSHPLABSRESULTS_GEN_ALL_CORE
Labs:  HbSAg:   HIV:  RPR:  Blood Type: O pos   Antibody Screen: neg   Rubella:   Varicella:  Measles:   GCT:   GBS: positive   Sequential Screen Part 1:  Sequential Screen Part 2:     [place]: Labs:  Blood Type: O pos   Antibody Screen: neg     [place]: none available

## 2021-01-23 NOTE — OB RN PATIENT PROFILE - NS_OBGYNHISTORY_OBGYN_ALL_OB_FT
OB Hx:   P1) 2008, FTNSVD, 6-14, F, [facility] in [place], [place]  P2) 2013, FTNSVD, 7-6, F, [facility]   P3) 2017, FT , 6-9, [facility]; complicated by hyperemesis gravidarum  P4) current complicated by hyperemesis gravidarum       Gyn Hx: Denies h/o abnormal pap, STI, ovarian cysts, or fibroids

## 2021-01-23 NOTE — OB PROVIDER H&P - ASSESSMENT
31yo  at 37w5d GA, GBS pos, symptomatic COVID-19 viral syndrome, for IOL for gestational hypertension    -admit to labor and delivery  -IV hydration and clear liquid diet  -Ampicillin for GBS ppx  -Admission labs  -COVID swab  -BPs [id number]  -Cont efm and toco    Dr. Johnston aware   29yo  at 37w5d GA, GBS pos, asymptomatic COVID-19 viral syndrome, for IOL for gestational hypertension.   -admit to labor and delivery  -IV hydration and clear liquid diet  -Ampicillin for GBS ppx  -Admission labs  -COVID swab  -BPs [id number]  -Cont efm and toco    Dr. Johnston aware        Pt tested positive for COVID-19 on 2021. Reports nasal congestion, with known hx of nasal congestion, and low grade fever (100.2, not considered a fever). Reports she has been in quarantine for approximately 5 weeks as both her  and one of her daughters tested positive for COVID-19. After further evaluation of patient's symptoms she is no longer deemed she is not symptomatic for COVID-19 symptoms.

## 2021-01-23 NOTE — OB PROVIDER TRIAGE NOTE - HISTORY OF PRESENT ILLNESS
tested positive for COVID-19 on Wednesday  Reports nasal congestion and fever two nights ago.   Denies issues with blood pressures between pregnancies. Decreased fetal movement for two days. Denies vaginal bleeding and leakage of fluid. Reports irregular contractions. Last saw Dr. Johnston approximately 5 weeks ago.  29yo  at 37w5d GA dated by first tri ultrasound presents to L&D with two days duration of decreased fetal movement. Reports drinking something cold, eating something sweet, and lying on her side. Reports irregular contractions. Denies vaginal bleeding or leakage of fluid. GBS positive.   Pt tested positive for COVID-19 on 2021. Reports nasal congestion and fever two nights ago. Reports she has been in quarantine for approximately 5 weeks as both her  and one of her daughters tested positive for COVID-19.  Previously seen in triage in October for elevated blood pressures, underwent prolonged monitoring and did not meet criteria. Denies issues with blood pressures between pregnancies. Reports intermittent temporal headaches. Denies vision changes, chest pain, shortness of breath, RUQ or epigastric pain, or sudden onset of lower extremity swelling.   H/o migraines, was previously taking sumatriptan PRN however stopped during pregnancy. H/o mild intermittent asthma, uses albuterol inhaler 1x weekly, no h/o hospitalizations or intubations. GBS unknown.     Last saw Dr. Johnston approximately 5 weeks ago.  29yo  at 37w5d GA dated by first trimester ultrasound presents to L&D with two days duration of decreased fetal movement. Reports drinking something cold, eating something sweet, and lying on her side. Reports irregular contractions. Denies vaginal bleeding or leakage of fluid. GBS positive.   Pt tested positive for COVID-19 on 2021. Reports nasal congestion and fever two nights ago. Reports she has been in quarantine for approximately 5 weeks as both her  and one of her daughters tested positive for COVID-19.  Previously seen in triage in October for elevated blood pressures, underwent prolonged monitoring and did not meet criteria. Denies issues with blood pressures between pregnancies. Reports intermittent temporal headaches. Denies vision changes, chest pain, shortness of breath, RUQ or epigastric pain, or sudden onset of lower extremity swelling.   H/o migraines, was previously taking sumatriptan PRN however stopped during pregnancy. H/o mild intermittent asthma, uses albuterol inhaler 1x weekly, no h/o hospitalizations or intubations.     Last saw Dr. Johnston approximately 5 weeks ago.

## 2021-01-24 ENCOUNTER — RESULT REVIEW (OUTPATIENT)
Age: 31
End: 2021-01-24

## 2021-01-24 LAB
HBV SURFACE AG SERPL QL IA: SIGNIFICANT CHANGE UP
MEV IGG SER-ACNC: <5 AU/ML — SIGNIFICANT CHANGE UP
MEV IGG+IGM SER-IMP: NEGATIVE — SIGNIFICANT CHANGE UP
RUBV IGG SER-ACNC: 2 INDEX — SIGNIFICANT CHANGE UP
RUBV IGG SER-IMP: POSITIVE — SIGNIFICANT CHANGE UP

## 2021-01-24 PROCEDURE — 88307 TISSUE EXAM BY PATHOLOGIST: CPT | Mod: 26

## 2021-01-24 PROCEDURE — 59409 OBSTETRICAL CARE: CPT | Mod: U7

## 2021-01-24 RX ORDER — BENZOCAINE 10 %
1 GEL (GRAM) MUCOUS MEMBRANE EVERY 6 HOURS
Refills: 0 | Status: DISCONTINUED | OUTPATIENT
Start: 2021-01-24 | End: 2021-01-27

## 2021-01-24 RX ORDER — IBUPROFEN 200 MG
600 TABLET ORAL EVERY 6 HOURS
Refills: 0 | Status: DISCONTINUED | OUTPATIENT
Start: 2021-01-24 | End: 2021-01-27

## 2021-01-24 RX ORDER — DIBUCAINE 1 %
1 OINTMENT (GRAM) RECTAL EVERY 6 HOURS
Refills: 0 | Status: DISCONTINUED | OUTPATIENT
Start: 2021-01-24 | End: 2021-01-27

## 2021-01-24 RX ORDER — OXYCODONE HYDROCHLORIDE 5 MG/1
5 TABLET ORAL
Refills: 0 | Status: DISCONTINUED | OUTPATIENT
Start: 2021-01-24 | End: 2021-01-27

## 2021-01-24 RX ORDER — MAGNESIUM HYDROXIDE 400 MG/1
30 TABLET, CHEWABLE ORAL
Refills: 0 | Status: DISCONTINUED | OUTPATIENT
Start: 2021-01-24 | End: 2021-01-27

## 2021-01-24 RX ORDER — IBUPROFEN 200 MG
600 TABLET ORAL EVERY 6 HOURS
Refills: 0 | Status: COMPLETED | OUTPATIENT
Start: 2021-01-24 | End: 2021-12-23

## 2021-01-24 RX ORDER — SIMETHICONE 80 MG/1
80 TABLET, CHEWABLE ORAL EVERY 4 HOURS
Refills: 0 | Status: DISCONTINUED | OUTPATIENT
Start: 2021-01-24 | End: 2021-01-27

## 2021-01-24 RX ORDER — OXYCODONE HYDROCHLORIDE 5 MG/1
5 TABLET ORAL ONCE
Refills: 0 | Status: DISCONTINUED | OUTPATIENT
Start: 2021-01-24 | End: 2021-01-27

## 2021-01-24 RX ORDER — LANOLIN
1 OINTMENT (GRAM) TOPICAL EVERY 6 HOURS
Refills: 0 | Status: DISCONTINUED | OUTPATIENT
Start: 2021-01-24 | End: 2021-01-27

## 2021-01-24 RX ORDER — SODIUM CHLORIDE 9 MG/ML
3 INJECTION INTRAMUSCULAR; INTRAVENOUS; SUBCUTANEOUS EVERY 8 HOURS
Refills: 0 | Status: DISCONTINUED | OUTPATIENT
Start: 2021-01-24 | End: 2021-01-27

## 2021-01-24 RX ORDER — OXYTOCIN 10 UNIT/ML
2 VIAL (ML) INJECTION
Qty: 30 | Refills: 0 | Status: DISCONTINUED | OUTPATIENT
Start: 2021-01-24 | End: 2021-01-24

## 2021-01-24 RX ORDER — AER TRAVELER 0.5 G/1
1 SOLUTION RECTAL; TOPICAL EVERY 4 HOURS
Refills: 0 | Status: DISCONTINUED | OUTPATIENT
Start: 2021-01-24 | End: 2021-01-27

## 2021-01-24 RX ORDER — PRAMOXINE HYDROCHLORIDE 150 MG/15G
1 AEROSOL, FOAM RECTAL EVERY 4 HOURS
Refills: 0 | Status: DISCONTINUED | OUTPATIENT
Start: 2021-01-24 | End: 2021-01-27

## 2021-01-24 RX ORDER — OXYTOCIN 10 UNIT/ML
333.33 VIAL (ML) INJECTION
Qty: 20 | Refills: 0 | Status: DISCONTINUED | OUTPATIENT
Start: 2021-01-24 | End: 2021-01-27

## 2021-01-24 RX ORDER — HYDROCORTISONE 1 %
1 OINTMENT (GRAM) TOPICAL EVERY 6 HOURS
Refills: 0 | Status: DISCONTINUED | OUTPATIENT
Start: 2021-01-24 | End: 2021-01-27

## 2021-01-24 RX ORDER — DIPHENHYDRAMINE HCL 50 MG
25 CAPSULE ORAL EVERY 6 HOURS
Refills: 0 | Status: DISCONTINUED | OUTPATIENT
Start: 2021-01-24 | End: 2021-01-27

## 2021-01-24 RX ORDER — ACETAMINOPHEN 500 MG
975 TABLET ORAL
Refills: 0 | Status: DISCONTINUED | OUTPATIENT
Start: 2021-01-24 | End: 2021-01-27

## 2021-01-24 RX ORDER — KETOROLAC TROMETHAMINE 30 MG/ML
30 SYRINGE (ML) INJECTION ONCE
Refills: 0 | Status: DISCONTINUED | OUTPATIENT
Start: 2021-01-24 | End: 2021-01-24

## 2021-01-24 RX ADMIN — Medication 30 MILLIGRAM(S): at 12:17

## 2021-01-24 RX ADMIN — Medication 108 GRAM(S): at 04:00

## 2021-01-24 RX ADMIN — SODIUM CHLORIDE 3 MILLILITER(S): 9 INJECTION INTRAMUSCULAR; INTRAVENOUS; SUBCUTANEOUS at 23:29

## 2021-01-24 RX ADMIN — Medication 108 GRAM(S): at 08:25

## 2021-01-24 RX ADMIN — Medication 108 GRAM(S): at 00:00

## 2021-01-24 RX ADMIN — Medication 600 MILLIGRAM(S): at 23:30

## 2021-01-24 RX ADMIN — Medication 975 MILLIGRAM(S): at 16:42

## 2021-01-24 NOTE — PROGRESS NOTE ADULT - ASSESSMENT
31yo  at 37w5d, GBS pos, symptomatic COVID-19 viral syndrome, gHTN r/o preeclampsia, s/p cervidil for IOL, doing well    - Cont EFM/[place]  - IV Hydration  - Pain Management prn  - Monitor vitals  - Clear Liquids  - Ampicillin for GBS ppx  - Will start pitocin after epidural     Dr. Johnston and Dr. Bourne aware

## 2021-01-24 NOTE — PROGRESS NOTE ADULT - ASSESSMENT
29yo  at 37w5d, GBS pos, symptomatic COVID-19 viral syndrome, gHTN r/o preeclampsia, s/p cervidil, on pitocin for IOL.    - Cont EFM/[place]  - IV Hydration  - Pain Management epidural  - Monitor vitals  - Clear Liquids  - Ampicillin for GBS ppx    Dr. Renner and Dr. Bourne aware 29yo  at 37w5d, GBS pos, symptomatic COVID-19 viral syndrome, gHTN, s/p cervidil, on pitocin for IOL.    - Cont EFM/[place]  - IV Hydration  - Pain Management epidural  - Monitor vitals  - Clear Liquids  - Ampicillin for GBS ppx    Dr. Renner and Dr. Bourne aware 29yo  at 37w5d, GBS pos, symptomatic COVID-19 viral syndrome, gHTN, s/p cervidil, on pitocin for IOL.    - Cont EFM/[place]  - IV Hydration  - Pain Management epidural  - Monitor vitals  - Clear Liquids  - Ampicillin for GBS ppx    Dr. Johnston and Dr. Bourne aware

## 2021-01-24 NOTE — OB PROVIDER DELIVERY SUMMARY - BABY A: DATE/TIME OF DELIVERY
increase Coreg 12.5mg po bid   lisinopril 40 daily  hydrazaline 25 tid  monitor VS closely 24-Jan-2021 10:20

## 2021-01-24 NOTE — CHART NOTE - NSCHARTNOTEFT_GEN_A_CORE
Pt tested positive for COVID-19 on 1/20/2021. Reports nasal congestion, with known hx of nasal congestion, and low grade fever (100.2, not considered a fever). Reports she has been in quarantine for approximately 5 weeks as both her  and one of her daughters tested positive for COVID-19. After further evaluation of patient's symptoms she is no longer deemed she is not symptomatic for COVID-19 symptoms.

## 2021-01-24 NOTE — OB PROVIDER DELIVERY SUMMARY - NSPROVIDERDELIVERYNOTE_OBGYN_ALL_OB_FT
Patient was fully dilated and pushing. Fetal head was OP and restituted to LOT. The anterior and posterior shoulders delivered, followed by the remaining body atraumatically. The  was handed to the mother and RN. Delayed cord clamping was performed, and then clamped and cut. Cord blood gases collected x2. The placenta delivered intact with membranes. Pitocin was administered. Uterus massaged, fundus found to be firm. Cervix, vagina and perineum inspected no lacerations was noted, with good hemostasis.     Viable male infant delivered,  with APGARs 9/9    Lacteration: none      Dr. Fung present for the delivery

## 2021-01-24 NOTE — OB PROVIDER DELIVERY SUMMARY - NSEPISIOTOMY_OBGYN_ALL_OB
Annamaria Kline.     Presenting Information: Ty Ty is currently attending the Sanpete Valley Ty's request.  4. Encouraged Ty to contact this writer should any questions/concerns arise before his next clinic visit.      Robyn Mancini [facility]  Clinical   [facility]  [phone]           No

## 2021-01-24 NOTE — PROCEDURE NOTE - NSLOADDOSE_OBGYN_ALL_OB
fentanyl 250 mcg added to infusion rate 15/hr/10 ML of 0.25 percent Bupivicaine/Continuous Bupivicaine 0.1 percent/100 Micrograms Fentanyl

## 2021-01-25 ENCOUNTER — TRANSCRIPTION ENCOUNTER (OUTPATIENT)
Age: 31
End: 2021-01-25

## 2021-01-25 LAB
BASOPHILS # BLD AUTO: 0.01 K/UL — SIGNIFICANT CHANGE UP (ref 0–0.2)
BASOPHILS NFR BLD AUTO: 0.1 % — SIGNIFICANT CHANGE UP (ref 0–1)
EOSINOPHIL # BLD AUTO: 0.17 K/UL — SIGNIFICANT CHANGE UP (ref 0–0.7)
EOSINOPHIL NFR BLD AUTO: 2.4 % — SIGNIFICANT CHANGE UP (ref 0–8)
HCT VFR BLD CALC: 29.4 % — LOW (ref 37–47)
HGB BLD-MCNC: 9.4 G/DL — LOW (ref 12–16)
IMM GRANULOCYTES NFR BLD AUTO: 0.3 % — SIGNIFICANT CHANGE UP (ref 0.1–0.3)
LYMPHOCYTES # BLD AUTO: 2.82 K/UL — SIGNIFICANT CHANGE UP (ref 1.2–3.4)
LYMPHOCYTES # BLD AUTO: 40.1 % — SIGNIFICANT CHANGE UP (ref 20.5–51.1)
MCHC RBC-ENTMCNC: 28.1 PG — SIGNIFICANT CHANGE UP (ref 27–31)
MCHC RBC-ENTMCNC: 32 G/DL — SIGNIFICANT CHANGE UP (ref 32–37)
MCV RBC AUTO: 87.8 FL — SIGNIFICANT CHANGE UP (ref 81–99)
MONOCYTES # BLD AUTO: 0.53 K/UL — SIGNIFICANT CHANGE UP (ref 0.1–0.6)
MONOCYTES NFR BLD AUTO: 7.5 % — SIGNIFICANT CHANGE UP (ref 1.7–9.3)
NEUTROPHILS # BLD AUTO: 3.48 K/UL — SIGNIFICANT CHANGE UP (ref 1.4–6.5)
NEUTROPHILS NFR BLD AUTO: 49.6 % — SIGNIFICANT CHANGE UP (ref 42.2–75.2)
NRBC # BLD: 0 /100 WBCS — SIGNIFICANT CHANGE UP (ref 0–0)
PLATELET # BLD AUTO: 179 K/UL — SIGNIFICANT CHANGE UP (ref 130–400)
RBC # BLD: 3.35 M/UL — LOW (ref 4.2–5.4)
RBC # FLD: 14.3 % — SIGNIFICANT CHANGE UP (ref 11.5–14.5)
WBC # BLD: 7.03 K/UL — SIGNIFICANT CHANGE UP (ref 4.8–10.8)
WBC # FLD AUTO: 7.03 K/UL — SIGNIFICANT CHANGE UP (ref 4.8–10.8)

## 2021-01-25 RX ORDER — IBUPROFEN 200 MG
1 TABLET ORAL
Qty: 0 | Refills: 0 | DISCHARGE
Start: 2021-01-25

## 2021-01-25 RX ORDER — ACETAMINOPHEN 500 MG
3 TABLET ORAL
Qty: 0 | Refills: 0 | DISCHARGE
Start: 2021-01-25

## 2021-01-25 RX ORDER — ALBUTEROL 90 UG/1
2 AEROSOL, METERED ORAL EVERY 6 HOURS
Refills: 0 | Status: DISCONTINUED | OUTPATIENT
Start: 2021-01-25 | End: 2021-01-27

## 2021-01-25 RX ADMIN — Medication 975 MILLIGRAM(S): at 11:40

## 2021-01-25 RX ADMIN — SODIUM CHLORIDE 3 MILLILITER(S): 9 INJECTION INTRAMUSCULAR; INTRAVENOUS; SUBCUTANEOUS at 14:31

## 2021-01-25 RX ADMIN — Medication 975 MILLIGRAM(S): at 14:30

## 2021-01-25 RX ADMIN — SODIUM CHLORIDE 3 MILLILITER(S): 9 INJECTION INTRAMUSCULAR; INTRAVENOUS; SUBCUTANEOUS at 06:46

## 2021-01-25 RX ADMIN — Medication 0.5 MILLILITER(S): at 17:44

## 2021-01-25 RX ADMIN — Medication 600 MILLIGRAM(S): at 11:53

## 2021-01-25 RX ADMIN — Medication 1 TABLET(S): at 11:53

## 2021-01-25 RX ADMIN — Medication 600 MILLIGRAM(S): at 17:27

## 2021-01-25 NOTE — DISCHARGE NOTE OB - PLAN OF CARE
Healthy recovery for both mom and baby No heavy lifting.   Nothing inside the vagina for 6 weeks: no tampons, douching, sexual intercourse, tub baths or pools.   If you have a fever over 100.4F, severe abdominal pain or heavy vaginal bleeding please call your doctor or go to the emergency room.  Please follow up with your OBGYN in 1 week for blood pressure check and 6 weeks for a postpartum visit. Please follow up with your OBGYN in 1 week for blood pressure check
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
## 2021-01-25 NOTE — DISCHARGE NOTE OB - MEDICATION SUMMARY - MEDICATIONS TO TAKE
I will START or STAY ON the medications listed below when I get home from the hospital:    acetaminophen 325 mg oral tablet  -- 3 tab(s) by mouth every 6 hours, As Needed  -- Indication: For pain    ibuprofen 600 mg oral tablet  -- 1 tab(s) by mouth every 6 hours, As Needed  -- Indication: For pain   I will START or STAY ON the medications listed below when I get home from the hospital:    acetaminophen 325 mg oral tablet  -- 3 tab(s) by mouth every 6 hours, As Needed  -- Indication: For pain    ibuprofen 600 mg oral tablet  -- 1 tab(s) by mouth every 6 hours, As Needed  -- Indication: For pain    NIFEdipine 30 mg oral tablet, extended release  -- 1 tab(s) by mouth once a day   -- Avoid grapefruit and grapefruit juice while taking this medication.  It is very important that you take or use this exactly as directed.  Do not skip doses or discontinue unless directed by your doctor.  Some non-prescription drugs may aggravate your condition.  Read all labels carefully.  If a warning appears, check with your doctor before taking.  Swallow whole.  Do not crush.    -- Indication: For high blood pressure

## 2021-01-25 NOTE — PROGRESS NOTE ADULT - ASSESSMENT
29 y/o P4, s/p , PPD#1, h/o asthma on albuterol inhaler, recovering well  - Continue routine postpartum care  - Pain management prn  - Encourage ambulation, oral hydration  - Monitor vitals and bleeding  - f/u AM CBC  - anticipate discharge home    Dr. Subramanian and Dr. Johnston to be made aware

## 2021-01-25 NOTE — DISCHARGE NOTE OB - PATIENT PORTAL LINK FT
You can access the FollowMyHealth Patient Portal offered by [facility] by registering at the following website: [email or web address]. By joining [facility]’s FollowMyHealth portal, you will also be able to view your health information using other applications (apps) compatible with our system.

## 2021-01-25 NOTE — DISCHARGE NOTE OB - CARE PLAN
Principal Discharge DX:	Vaginal delivery  Goal:	Healthy recovery for both mom and baby  Assessment and plan of treatment:	No heavy lifting.   Nothing inside the vagina for 6 weeks: no tampons, douching, sexual intercourse, tub baths or pools.   If you have a fever over 100.4F, severe abdominal pain or heavy vaginal bleeding please call your doctor or go to the emergency room.  Please follow up with your OBGYN in 1 week for blood pressure check and 6 weeks for a postpartum visit.  Secondary Diagnosis:	Gestational hypertension  Goal:	Healthy recovery for both mom and baby  Assessment and plan of treatment:	Please follow up with your OBGYN in 1 week for blood pressure check

## 2021-01-25 NOTE — DISCHARGE NOTE OB - CARE PROVIDER_API CALL
Elsy Johnston  OBSTETRICS AND GYNECOLOGY  [address] [place] [address]  Phone: [phone]  Fax: [phone]  Follow Up Time:

## 2021-01-26 LAB
SARS-COV-2 IGG SERPL QL IA: NEGATIVE — SIGNIFICANT CHANGE UP
SARS-COV-2 IGM SERPL IA-ACNC: <3.8 AU/ML — SIGNIFICANT CHANGE UP

## 2021-01-26 RX ORDER — NIFEDIPINE 30 MG
30 TABLET, EXTENDED RELEASE 24 HR ORAL AT BEDTIME
Refills: 0 | Status: DISCONTINUED | OUTPATIENT
Start: 2021-01-26 | End: 2021-01-27

## 2021-01-26 RX ADMIN — Medication 600 MILLIGRAM(S): at 18:44

## 2021-01-26 RX ADMIN — Medication 600 MILLIGRAM(S): at 06:22

## 2021-01-26 RX ADMIN — Medication 600 MILLIGRAM(S): at 13:59

## 2021-01-26 RX ADMIN — Medication 30 MILLIGRAM(S): at 21:50

## 2021-01-26 RX ADMIN — Medication 1 TABLET(S): at 13:58

## 2021-01-26 RX ADMIN — Medication 975 MILLIGRAM(S): at 21:49

## 2021-01-26 RX ADMIN — Medication 975 MILLIGRAM(S): at 08:59

## 2021-01-26 NOTE — PROGRESS NOTE ADULT - ASSESSMENT
29 y/o P4, s/p , PPD#2, complicated by gHTN, currently normotensive, h/o asthma on albuterol inhaler, recovering well    -Monitor vitals, bleeding  -Encourage PO hydration, ambulation  -Regular Diet  -Pain managment prn  -Simethicone prn  -Anticipate d/c home    Dr. Jones and Dr. Subramanian to be made aware

## 2021-01-27 VITALS
SYSTOLIC BLOOD PRESSURE: 132 MMHG | HEART RATE: 75 BPM | TEMPERATURE: 98 F | DIASTOLIC BLOOD PRESSURE: 72 MMHG | RESPIRATION RATE: 18 BRPM

## 2021-01-27 RX ORDER — NIFEDIPINE 30 MG
1 TABLET, EXTENDED RELEASE 24 HR ORAL
Qty: 30 | Refills: 0
Start: 2021-01-27 | End: 2021-02-25

## 2021-01-27 RX ADMIN — Medication 600 MILLIGRAM(S): at 00:25

## 2021-01-27 RX ADMIN — Medication 600 MILLIGRAM(S): at 07:01

## 2021-01-27 RX ADMIN — Medication 1 TABLET(S): at 12:11

## 2021-01-27 RX ADMIN — Medication 600 MILLIGRAM(S): at 12:11
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
## 2021-01-27 NOTE — PROGRESS NOTE ADULT - ASSESSMENT
31 y/o P4, s/p , PPD#3, complicated by gHTN, currently normotensive, h/o asthma on albuterol inhaler, recovering well    -Monitor vitals, bleeding  -Continue Procardia 30  -Encourage PO hydration, ambulation  -Regular Diet  -Pain managment prn  -Simethicone prn  -Anticipate d/c home    Dr. Johnston and Dr. Subramanian to be made aware

## 2021-01-28 DIAGNOSIS — Z71.89 OTHER SPECIFIED COUNSELING: ICD-10-CM

## 2021-01-28 PROBLEM — Z78.9 OTHER SPECIFIED HEALTH STATUS: Chronic | Status: INACTIVE | Noted: 2019-05-06 | Resolved: 2021-01-23

## 2021-01-28 LAB — SURGICAL PATHOLOGY STUDY: SIGNIFICANT CHANGE UP

## 2021-01-29 DIAGNOSIS — Z86.69 PERSONAL HISTORY OF OTHER DISEASES OF THE NERVOUS SYSTEM AND SENSE ORGANS: ICD-10-CM

## 2021-01-29 DIAGNOSIS — Z20.822 CONTACT WITH AND (SUSPECTED) EXPOSURE TO COVID-19: ICD-10-CM

## 2021-01-29 DIAGNOSIS — U07.1 COVID-19: ICD-10-CM

## 2021-01-29 DIAGNOSIS — Z86.79 PERSONAL HISTORY OF OTHER DISEASES OF THE CIRCULATORY SYSTEM: ICD-10-CM

## 2021-01-29 DIAGNOSIS — J45.20 MILD INTERMITTENT ASTHMA, UNCOMPLICATED: ICD-10-CM

## 2021-01-29 DIAGNOSIS — O36.8130 DECREASED FETAL MOVEMENTS, THIRD TRIMESTER, NOT APPLICABLE OR UNSPECIFIED: ICD-10-CM

## 2021-01-29 DIAGNOSIS — Z91.013 ALLERGY TO SEAFOOD: ICD-10-CM

## 2021-01-29 DIAGNOSIS — Z91.048 OTHER NONMEDICINAL SUBSTANCE ALLERGY STATUS: ICD-10-CM

## 2021-01-29 DIAGNOSIS — Z3A.37 37 WEEKS GESTATION OF PREGNANCY: ICD-10-CM

## 2021-01-29 DIAGNOSIS — Z86.16 PERSONAL HISTORY OF COVID-19: ICD-10-CM

## 2021-02-03 NOTE — ED ADULT NURSE NOTE - NSFALLRSKASSESSDT_ED_ALL_ED
[Time Spent: ___ minutes] : I have spent [unfilled] minutes of time on the encounter. [>50% of the face to face encounter time was spent on counseling and/or coordination of care for ___] : Greater than 50% of the face to face encounter time was spent on counseling and/or coordination of care for [unfilled] 06-May-2019 18:51

## 2021-08-25 NOTE — OB PROVIDER TRIAGE NOTE - NO PERTINENT FAMILY HISTORY
Pt needs new rx  sent to pharmacy for prior to surgery. <<----- Click to add NO pertinent Family History

## 2022-01-01 NOTE — OB RN PATIENT PROFILE - BRADEN SCORE (IF 18 OR LESS ACTIVATE SKIN INJURY RISK INCREASED GUIDELINE), MLM
Problem: Alteration in Family Bonding  Goal: Family Interaction is supported  Outcome: Outcome Not Met, Continue to Monitor   Support family interaction and parent participation in infant care when visiting.   23

## 2022-06-15 PROBLEM — S62.102A FRACTURE OF UNSPECIFIED CARPAL BONE, LEFT WRIST, INITIAL ENCOUNTER FOR CLOSED FRACTURE: Chronic | Status: ACTIVE | Noted: 2021-01-23

## 2022-06-15 PROBLEM — R40.2440: Chronic | Status: ACTIVE | Noted: 2021-01-23

## 2022-06-15 PROBLEM — R42 DIZZINESS AND GIDDINESS: Chronic | Status: ACTIVE | Noted: 2021-01-23

## 2022-06-27 NOTE — OB PROVIDER TRIAGE NOTE - NSDELIVERYTYPE2_OBGYN_ALL_OB
Vaginal Prednisone Counseling:  I discussed with the patient the risks of prolonged use of prednisone including but not limited to weight gain, insomnia, osteoporosis, mood changes, diabetes, susceptibility to infection, glaucoma and high blood pressure.  In cases where prednisone use is prolonged, patients should be monitored with blood pressure checks, serum glucose levels and an eye exam.  Additionally, the patient may need to be placed on GI prophylaxis, PCP prophylaxis, and calcium and vitamin D supplementation and/or a bisphosphonate.  The patient verbalized understanding of the proper use and the possible adverse effects of prednisone.  All of the patient's questions and concerns were addressed.

## 2022-07-27 ENCOUNTER — APPOINTMENT (OUTPATIENT)
Dept: NEUROLOGY | Facility: CLINIC | Age: 32
End: 2022-07-27

## 2022-08-17 ENCOUNTER — APPOINTMENT (OUTPATIENT)
Dept: ORTHOPEDIC SURGERY | Facility: CLINIC | Age: 32
End: 2022-08-17

## 2022-08-17 PROCEDURE — 99214 OFFICE O/P EST MOD 30 MIN: CPT

## 2022-08-17 NOTE — HISTORY OF PRESENT ILLNESS
[de-identified] : Patient comes in for follow-up of her right TFCC injury.  She still having pain.  She says that she did something to her wrist because she injured the other side she leaned on this side and she felt some snapping.  She is concerned she may have injured it more.  She is having significant pain.  She never did any therapy.  She does not have other complaints today.

## 2022-08-17 NOTE — ASSESSMENT
[FreeTextEntry1] : we reviewed the anatomy, pathology, and treatment options for TFCC tears. We discussed that most of the TFCC is avascular and tears are slow to heal if at all but that surgical results are not guaranteed due to the poor healing capacity of the structure.  Even at surgery, most tears cannot be repaired, but are merely debrided.  We discussed the use of nsaids, bracing, rest, local modalities, injection and surgery in the treatment of TFCC tears.   The patient may have re-injured the area.  I would like a new MRI.  She will follow up with me once the MRI is complete.  I did discuss with her the option of considering therapy versus surgery.  She is going to think about what she would like to do 1st.  Once we get the MRI we will discuss more.

## 2022-08-17 NOTE — IMAGING
[de-identified] : R wrist:\par Swelling\par Tender TFCC\par Pain with pronation/supination\par Decreased wrist ROM\par +TFCC grind\par \par

## 2022-08-26 NOTE — OB PROVIDER TRIAGE NOTE - NS_SONONOTE_OBGYN_ALL_OB_FT
Left message at # on file for return call. Not sure if it's patient number or her daughter.    EFW [phone] (61.2%)

## 2022-08-31 ENCOUNTER — APPOINTMENT (OUTPATIENT)
Dept: NEUROLOGY | Facility: CLINIC | Age: 32
End: 2022-08-31

## 2022-08-31 ENCOUNTER — APPOINTMENT (OUTPATIENT)
Dept: MRI IMAGING | Facility: CLINIC | Age: 32
End: 2022-08-31

## 2022-09-29 ENCOUNTER — APPOINTMENT (OUTPATIENT)
Dept: NEUROLOGY | Facility: CLINIC | Age: 32
End: 2022-09-29

## 2022-09-29 VITALS — DIASTOLIC BLOOD PRESSURE: 96 MMHG | SYSTOLIC BLOOD PRESSURE: 153 MMHG | HEART RATE: 98 BPM

## 2022-09-29 PROCEDURE — 99204 OFFICE O/P NEW MOD 45 MIN: CPT

## 2022-09-29 NOTE — ASSESSMENT
[FreeTextEntry1] : Ms. Webb presents today as a 32 year old woman with a chief complaint of lower back pain. She has had these symptoms for over 20 months now. I am referring her to see a pain specialist for possible injection treatment to the lumbar spine. We need an MRI of the lumbar spine as well to rule out herniated disc, as well as EMGs of the LE. \par \par Patient has a secondary complaint of headaches. We are starting the patient on magnesium for headache prevention as well as sumatriptan for treatment. I will see her back in about a month. \par \par I, Max Douglas, attest that this documentation has been prepared under the direction and in the presence of Provider Annie Turcios \par \par Thank you for allowing me to assist in the management of this patient.\par \par \par Td Garcia DO\angela Board Certified, Neurology\par

## 2022-09-29 NOTE — HISTORY OF PRESENT ILLNESS
[FreeTextEntry1] : Ms. Webb presents today as a 32 year old woman with a chief complaint of lower back pain. She has had these symptoms for over 20 months now. She has not undergone any lower back imaging. Patient has been seeing a chiropractor for over 6 months without any relief. Lower back pain travels into the left lower extremity. She has been seeing a rheumatologist which she was cleared for. \par \par Patient has a secondary complaint of headaches. She denies headache aura. Pain increased when coughing and sneezing, associated with vertigo. She has been seeing a specialist in regards to vertigo. She is not currently trialing medication for vertigo, though she has in the past with good relief. These symptoms worsened after a recent blood transfusion. Patient has headaches daily.

## 2022-10-10 ENCOUNTER — APPOINTMENT (OUTPATIENT)
Dept: MRI IMAGING | Facility: CLINIC | Age: 32
End: 2022-10-10

## 2022-10-21 ENCOUNTER — APPOINTMENT (OUTPATIENT)
Dept: NEUROLOGY | Facility: CLINIC | Age: 32
End: 2022-10-21

## 2022-10-21 PROCEDURE — 95912 NRV CNDJ TEST 11-12 STUDIES: CPT

## 2022-10-21 PROCEDURE — 95886 MUSC TEST DONE W/N TEST COMP: CPT

## 2022-10-27 ENCOUNTER — APPOINTMENT (OUTPATIENT)
Dept: RADIOLOGY | Facility: CLINIC | Age: 32
End: 2022-10-27

## 2022-10-27 ENCOUNTER — APPOINTMENT (OUTPATIENT)
Dept: PAIN MANAGEMENT | Facility: CLINIC | Age: 32
End: 2022-10-27

## 2022-10-27 VITALS
BODY MASS INDEX: 30.36 KG/M2 | HEART RATE: 88 BPM | WEIGHT: 205 LBS | HEIGHT: 69 IN | DIASTOLIC BLOOD PRESSURE: 90 MMHG | SYSTOLIC BLOOD PRESSURE: 132 MMHG

## 2022-10-27 DIAGNOSIS — Z82.49 FAMILY HISTORY OF ISCHEMIC HEART DISEASE AND OTHER DISEASES OF THE CIRCULATORY SYSTEM: ICD-10-CM

## 2022-10-27 DIAGNOSIS — Z86.79 PERSONAL HISTORY OF OTHER DISEASES OF THE CIRCULATORY SYSTEM: ICD-10-CM

## 2022-10-27 DIAGNOSIS — Z86.59 PERSONAL HISTORY OF OTHER MENTAL AND BEHAVIORAL DISORDERS: ICD-10-CM

## 2022-10-27 DIAGNOSIS — Z87.39 PERSONAL HISTORY OF OTHER DISEASES OF THE MUSCULOSKELETAL SYSTEM AND CONNECTIVE TISSUE: ICD-10-CM

## 2022-10-27 DIAGNOSIS — M25.562 PAIN IN LEFT KNEE: ICD-10-CM

## 2022-10-27 DIAGNOSIS — Z83.3 FAMILY HISTORY OF DIABETES MELLITUS: ICD-10-CM

## 2022-10-27 DIAGNOSIS — Z86.73 PERSONAL HISTORY OF TRANSIENT ISCHEMIC ATTACK (TIA), AND CEREBRAL INFARCTION W/OUT RESIDUAL DEFICITS: ICD-10-CM

## 2022-10-27 DIAGNOSIS — Z78.9 OTHER SPECIFIED HEALTH STATUS: ICD-10-CM

## 2022-10-27 DIAGNOSIS — Z86.2 PERSONAL HISTORY OF DISEASES OF THE BLOOD AND BLOOD-FORMING ORGANS AND CERTAIN DISORDERS INVOLVING THE IMMUNE MECHANISM: ICD-10-CM

## 2022-10-27 DIAGNOSIS — Z87.09 PERSONAL HISTORY OF OTHER DISEASES OF THE RESPIRATORY SYSTEM: ICD-10-CM

## 2022-10-27 PROCEDURE — 99204 OFFICE O/P NEW MOD 45 MIN: CPT

## 2022-10-28 PROBLEM — Z87.09 HISTORY OF ASTHMA: Status: RESOLVED | Noted: 2022-10-27 | Resolved: 2022-10-28

## 2022-10-28 PROBLEM — Z82.49 FAMILY HISTORY OF HYPERTENSION: Status: ACTIVE | Noted: 2022-10-27

## 2022-10-28 PROBLEM — Z86.79 HISTORY OF HYPERTENSION: Status: RESOLVED | Noted: 2022-10-27 | Resolved: 2022-10-28

## 2022-10-28 PROBLEM — Z78.9 NON-SMOKER: Status: ACTIVE | Noted: 2022-10-27

## 2022-10-28 PROBLEM — Z87.39 HISTORY OF ARTHRITIS: Status: RESOLVED | Noted: 2022-10-27 | Resolved: 2022-10-28

## 2022-10-28 PROBLEM — Z87.39 HISTORY OF SCOLIOSIS: Status: RESOLVED | Noted: 2022-10-27 | Resolved: 2022-10-28

## 2022-10-28 PROBLEM — Z86.2 HISTORY OF BLEEDING DISORDER: Status: RESOLVED | Noted: 2022-10-27 | Resolved: 2022-10-28

## 2022-10-28 PROBLEM — Z83.3 FAMILY HISTORY OF DIABETES MELLITUS: Status: ACTIVE | Noted: 2022-10-27

## 2022-10-28 PROBLEM — Z86.73 HISTORY OF CEREBROVASCULAR ACCIDENT: Status: RESOLVED | Noted: 2022-10-27 | Resolved: 2022-10-28

## 2022-10-28 PROBLEM — Z86.59 HISTORY OF ANXIETY: Status: RESOLVED | Noted: 2022-10-27 | Resolved: 2022-10-28

## 2022-10-28 NOTE — PHYSICAL EXAM
[de-identified] : Lumbar Spine Exam:\par Inspection:\par erythema (-)\par ecchymosis (-)\par rashes (-)\par \par Palpation:\par Midline lumbar tenderness:             (-)\par paraspinal tenderness:                  L (+) ; R (+)\par sciatic nerve tenderness :             L (+) ; R (+)\par SI joint tenderness:                        L (+) ; R (+)\par GTB tenderness:                            L (-);  R (-)\par \par normal ROM, albeit painful in lumbar flexion\par \par Strength:\par 5/5 throughout all muscle groups of the lower extremity\par \par                                    Right       Left   \par Hip Flexion:                (5/5)       (5/5)\par Quadriceps:               (5/5)       (5/5)\par Hamstrings:                (5/5)       (5/5)\par Ankle Dorsiflexion:     (5/5)       (5/5)\par EHL:                           (5/5)       (5/5)\par Ankle Plantarflexion:  (5/5)       (5/5)\par \par Special Tests:\par SLR:                           R (-) ; L (-)\par Facet loading:            R (-) ; L (-)\par ELY test:               R (-) ; L (-)\par \par Neurologic:\par   Decreased sensation along the L4 dermatome bilaterally, although left side is more substantial\par Reflexes normal and symmetric \par \par Gait:\par non- antalgic gait\par ambulates w/o assistive device

## 2022-10-28 NOTE — REASON FOR VISIT
[Initial Visit] : an initial pain management visit [FreeTextEntry2] : evaluation lower back down extremities

## 2022-10-28 NOTE — DISCUSSION/SUMMARY
[de-identified] :  plan\par  MRI of the lumbar spine without contrast is indicated at this time.\par \par \par   X-ray of the left knee

## 2022-10-28 NOTE — HISTORY OF PRESENT ILLNESS
Problem: Discharge Planning  Goal: Discharge to home or other facility with appropriate resources  Outcome: Progressing  Flowsheets (Taken 2022 1608 by Ramses Dan RN)  Discharge to home or other facility with appropriate resources: Identify barriers to discharge with patient and caregiver     Problem: Thermoregulation - /Pediatrics  Goal: Maintains normal body temperature  Outcome: Progressing  Flowsheets (Taken 2022 by Davon Solorio RN)  Maintains Normal Body Temperature:   Monitor temperature (axillary for Newborns) as ordered   Monitor for signs of hypothermia or hyperthermia   Provide thermal support measures     Problem: Pain -   Goal: Displays adequate comfort level or baseline comfort level  Outcome: Progressing     Problem: Safety - [place]  Goal: Free from fall injury  Outcome: Progressing  Flowsheets (Taken 2022 1739)  Free From Fall Injury: Instruct family/caregiver on patient safety     Problem: Normal   Goal: [place] experiences normal transition  Outcome: Progressing  Flowsheets (Taken 2022 1653)  Experiences Normal Transition: Monitor vital signs     Problem: Normal   Goal: Total Weight Loss Less than 10% of birth weight  Outcome: Progressing   Plan of care reviewed with mother and/or legal guardian. Questions & concerns addressed with verbalized understanding from mother and/or legal guardian. Mother and/or legal guardian participated in goal setting for their baby. [FreeTextEntry1] : This patient presents to the Pain Clinic complaining of axial low back pain bilaterally, which refers to the bilateral buttock, lateral thigh, posterior thigh, and to the lower leg on the lateral side and occasionally to the lateral lower foot.  The patient experiences axial low back pain constantly all times throughout the day, but her symptoms that refer down the leg occur generally with prolonged sitting bending forward and at night when she lays down in bed.  She has had axial low back pain for a long time about a year or so without any precipitating event, but the pain at that time was more manageable.  Over the last 3 months at least her pain has become more persistent and more severe in nature about an 8 to a 9/10.  The pain now also has a worsening radiating pattern down the legs that is giving the patient pause and concerning her.  She states that the pain in the low back is a constant soreness and achiness as if she has been repeatedly punched in the back.  She says the pain that radiates down the leg is a talking/pulling or a stabbing sensation.  She also reports decreased sensation along the lateral thighs  On both sides.  She has been taking NSAIDs  That was physician directed daily for the last 2 months after seeing an orthopedic doctor with 0 to minimal benefit, but continues to take it because her pain is very much affecting her quality of life and ability to perform her duties at work.  At work she sits down for many hours, which is difficult for her.\par \par The patient also complains of left knee pain that had also started over 6 months ago and likely close to a year ago.  There was no precipitating injury or trauma that she can recall.  The pain had been mild to moderate 4 months but similar to the low back pain has been more persistent and more severe over the last few months.  The patient is taking NSAIDs for pain as needed, but is not very effective at this time.  The pain is described as sharp aching and very limiting when standing and walking.  She denies any catching or clicking or instability.  Patient has not fallen or feel like her knee is going to " give out." the pain is made worse with standing and walking and can be relieved by about 30-40% by sitting down.  Pain is about a 6 out of 10 on the pain scale on average and can be as bad as an 8/10 after prolonged standing or walking.

## 2022-11-04 ENCOUNTER — APPOINTMENT (OUTPATIENT)
Dept: MRI IMAGING | Facility: CLINIC | Age: 32
End: 2022-11-04

## 2022-11-04 PROCEDURE — 72148 MRI LUMBAR SPINE W/O DYE: CPT

## 2022-11-04 PROCEDURE — 70551 MRI BRAIN STEM W/O DYE: CPT

## 2022-11-09 ENCOUNTER — APPOINTMENT (OUTPATIENT)
Dept: NEUROLOGY | Facility: CLINIC | Age: 32
End: 2022-11-09

## 2022-11-09 VITALS
HEIGHT: 69 IN | WEIGHT: 205 LBS | HEART RATE: 87 BPM | BODY MASS INDEX: 30.36 KG/M2 | SYSTOLIC BLOOD PRESSURE: 132 MMHG | DIASTOLIC BLOOD PRESSURE: 60 MMHG

## 2022-11-09 DIAGNOSIS — G43.709 CHRONIC MIGRAINE W/OUT AURA, NOT INTRACTABLE, W/OUT STATUS MIGRAINOSUS: ICD-10-CM

## 2022-11-09 DIAGNOSIS — R52 PAIN, UNSPECIFIED: ICD-10-CM

## 2022-11-09 DIAGNOSIS — M54.16 RADICULOPATHY, LUMBAR REGION: ICD-10-CM

## 2022-11-09 PROCEDURE — 99214 OFFICE O/P EST MOD 30 MIN: CPT

## 2022-11-09 RX ORDER — SUMATRIPTAN 100 MG/1
100 TABLET, FILM COATED ORAL
Qty: 9 | Refills: 5 | Status: ACTIVE | COMMUNITY
Start: 2022-09-29 | End: 1900-01-01

## 2022-11-09 RX ORDER — MAGNESIUM OXIDE 420 MG
420 (252 MG) TABLET ORAL
Qty: 30 | Refills: 5 | Status: ACTIVE | COMMUNITY
Start: 2022-09-29 | End: 1900-01-01

## 2022-11-09 NOTE — ASSESSMENT
[FreeTextEntry1] :   Lumbar radiculopathy- symptom on the proportion of the pathology seen on MRI of the lumbar spine, therefore an underlying autoimmune issue is suspected.  I am sending her for blood work.  \par \par Migraine headaches - it may be secondary to the same underlying rheumatological /autoimmune issues that is causing her significant back pain.  She should go for the blood work as soon as possible but in the meantime she can use magnesium for prophylaxis as well as sumatriptan for abortive therapy.  I sent the medication to a different pharmacy that she wanted.

## 2022-11-09 NOTE — HISTORY OF PRESENT ILLNESS
[FreeTextEntry1] : Ms. Webb presents today as a 32 year old woman with a chief complaint of lower back pain. She has had these symptoms for over 20 months now. She has not undergone any lower back imaging. Patient has been seeing a chiropractor for over 6 months without any relief. Lower back pain travels into the left lower extremity. She has been seeing a rheumatologist which she was cleared for. \par \par Patient has a secondary complaint of headaches. She denies headache aura. Pain increased when coughing and sneezing, associated with vertigo. She has been seeing a specialist in regards to vertigo. She is not currently trialing medication for vertigo, though she has in the past with good relief. These symptoms worsened after a recent blood transfusion. Patient has headaches daily. \par \par \par today, Ms. ARA WEBB returns to the office for follow-up and her prior history and physical have been reviewed and she reports no change since last visit.  she has been seeing us for  migraine headaches and chronic lumbar radiculopathy.  She was sent for MRI of the brain which showed a 4 mm cerebellar tonsillar ectopia, but otherwise unremarkable.   MRI of the lumbar spine only showed a very small subligamentous disc herniation at the level of L5-S1.  EMG nerve conduction study of the lower extremities was normal.    She reports no change clinically compared to last seen.  She has not done physical therapy for her back yet.    she was given magnesium for prophylaxis as well as sumatriptan for abortive therapy, but the pharmacy that we send the medication 2 never called her therefore she never tried.\par \par there was suspicion of an underlying autoimmune disease in the past.  She did see a rheumatologist once but never went back for follow-up.  She was put on a course of steroids but was not continue.\par \par \par

## 2022-11-17 ENCOUNTER — APPOINTMENT (OUTPATIENT)
Dept: PAIN MANAGEMENT | Facility: CLINIC | Age: 32
End: 2022-11-17

## 2022-11-23 ENCOUNTER — OUTPATIENT (OUTPATIENT)
Dept: OUTPATIENT SERVICES | Facility: HOSPITAL | Age: 32
LOS: 1 days | Discharge: HOME | End: 2022-11-23

## 2022-11-23 VITALS
OXYGEN SATURATION: 100 % | SYSTOLIC BLOOD PRESSURE: 139 MMHG | HEART RATE: 80 BPM | WEIGHT: 198.42 LBS | DIASTOLIC BLOOD PRESSURE: 94 MMHG | HEIGHT: 68 IN | TEMPERATURE: 98 F | RESPIRATION RATE: 16 BRPM

## 2022-11-23 DIAGNOSIS — Z30.2 ENCOUNTER FOR STERILIZATION: ICD-10-CM

## 2022-11-23 DIAGNOSIS — Z98.890 OTHER SPECIFIED POSTPROCEDURAL STATES: Chronic | ICD-10-CM

## 2022-11-23 DIAGNOSIS — Z01.818 ENCOUNTER FOR OTHER PREPROCEDURAL EXAMINATION: ICD-10-CM

## 2022-11-23 DIAGNOSIS — Z90.49 ACQUIRED ABSENCE OF OTHER SPECIFIED PARTS OF DIGESTIVE TRACT: Chronic | ICD-10-CM

## 2022-11-23 LAB
ALBUMIN SERPL ELPH-MCNC: 4.5 G/DL — SIGNIFICANT CHANGE UP (ref 3.5–5.2)
ALP SERPL-CCNC: 101 U/L — SIGNIFICANT CHANGE UP (ref 30–115)
ALT FLD-CCNC: 7 U/L — SIGNIFICANT CHANGE UP (ref 0–41)
ANION GAP SERPL CALC-SCNC: 13 MMOL/L — SIGNIFICANT CHANGE UP (ref 7–14)
APTT BLD: 30.8 SEC — SIGNIFICANT CHANGE UP (ref 27–39.2)
AST SERPL-CCNC: 9 U/L — SIGNIFICANT CHANGE UP (ref 0–41)
BASOPHILS # BLD AUTO: 0.06 K/UL — SIGNIFICANT CHANGE UP (ref 0–0.2)
BASOPHILS NFR BLD AUTO: 0.6 % — SIGNIFICANT CHANGE UP (ref 0–1)
BILIRUB SERPL-MCNC: 0.3 MG/DL — SIGNIFICANT CHANGE UP (ref 0.2–1.2)
BUN SERPL-MCNC: 11 MG/DL — SIGNIFICANT CHANGE UP (ref 10–20)
CALCIUM SERPL-MCNC: 9.4 MG/DL — SIGNIFICANT CHANGE UP (ref 8.4–10.5)
CHLORIDE SERPL-SCNC: 104 MMOL/L — SIGNIFICANT CHANGE UP (ref 98–110)
CO2 SERPL-SCNC: 24 MMOL/L — SIGNIFICANT CHANGE UP (ref 17–32)
CREAT SERPL-MCNC: 0.8 MG/DL — SIGNIFICANT CHANGE UP (ref 0.7–1.5)
EGFR: 101 ML/MIN/1.73M2 — SIGNIFICANT CHANGE UP
EOSINOPHIL # BLD AUTO: 0.34 K/UL — SIGNIFICANT CHANGE UP (ref 0–0.7)
EOSINOPHIL NFR BLD AUTO: 3.6 % — SIGNIFICANT CHANGE UP (ref 0–8)
GLUCOSE SERPL-MCNC: 72 MG/DL — SIGNIFICANT CHANGE UP (ref 70–99)
HCT VFR BLD CALC: 25.8 % — LOW (ref 37–47)
HGB BLD-MCNC: 7.7 G/DL — LOW (ref 12–16)
IMM GRANULOCYTES NFR BLD AUTO: 0.5 % — HIGH (ref 0.1–0.3)
INR BLD: 1.07 RATIO — SIGNIFICANT CHANGE UP (ref 0.65–1.3)
LYMPHOCYTES # BLD AUTO: 3.73 K/UL — HIGH (ref 1.2–3.4)
LYMPHOCYTES # BLD AUTO: 39.2 % — SIGNIFICANT CHANGE UP (ref 20.5–51.1)
MCHC RBC-ENTMCNC: 20.9 PG — LOW (ref 27–31)
MCHC RBC-ENTMCNC: 29.8 G/DL — LOW (ref 32–37)
MCV RBC AUTO: 70.1 FL — LOW (ref 81–99)
MONOCYTES # BLD AUTO: 0.76 K/UL — HIGH (ref 0.1–0.6)
MONOCYTES NFR BLD AUTO: 8 % — SIGNIFICANT CHANGE UP (ref 1.7–9.3)
NEUTROPHILS # BLD AUTO: 4.57 K/UL — SIGNIFICANT CHANGE UP (ref 1.4–6.5)
NEUTROPHILS NFR BLD AUTO: 48.1 % — SIGNIFICANT CHANGE UP (ref 42.2–75.2)
NRBC # BLD: 0 /100 WBCS — SIGNIFICANT CHANGE UP (ref 0–0)
PLATELET # BLD AUTO: 437 K/UL — HIGH (ref 130–400)
POTASSIUM SERPL-MCNC: 4.4 MMOL/L — SIGNIFICANT CHANGE UP (ref 3.5–5)
POTASSIUM SERPL-SCNC: 4.4 MMOL/L — SIGNIFICANT CHANGE UP (ref 3.5–5)
PROT SERPL-MCNC: 7.4 G/DL — SIGNIFICANT CHANGE UP (ref 6–8)
PROTHROM AB SERPL-ACNC: 12.2 SEC — SIGNIFICANT CHANGE UP (ref 9.95–12.87)
RBC # BLD: 3.68 M/UL — LOW (ref 4.2–5.4)
RBC # FLD: 18.1 % — HIGH (ref 11.5–14.5)
SODIUM SERPL-SCNC: 141 MMOL/L — SIGNIFICANT CHANGE UP (ref 135–146)
WBC # BLD: 9.51 K/UL — SIGNIFICANT CHANGE UP (ref 4.8–10.8)
WBC # FLD AUTO: 9.51 K/UL — SIGNIFICANT CHANGE UP (ref 4.8–10.8)

## 2022-11-23 PROCEDURE — 93010 ELECTROCARDIOGRAM REPORT: CPT

## 2022-11-23 RX ORDER — MULTIVIT WITH MIN/MFOLATE/K2 340-15/3 G
0 POWDER (GRAM) ORAL
Qty: 0 | Refills: 0 | DISCHARGE

## 2022-11-23 RX ORDER — SUMATRIPTAN SUCCINATE 4 MG/.5ML
0 INJECTION, SOLUTION SUBCUTANEOUS
Qty: 0 | Refills: 0 | DISCHARGE

## 2022-11-23 RX ORDER — MAGNESIUM OXIDE 400 MG ORAL TABLET 241.3 MG
1 TABLET ORAL
Qty: 0 | Refills: 0 | DISCHARGE
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
## 2022-11-23 NOTE — H&P PST ADULT - OTHER CARE PROVIDERS
neuro: dt migraines&  back issues& referring to rheum...pt unsure if any rheum disorder?  in process of rheum w/u no appt until january, pt is unsure if should delay procedure until w/u complete, pt advised to contact surgeon to discuss

## 2022-11-23 NOTE — H&P PST ADULT - HISTORY OF PRESENT ILLNESS
Patient is a 32  year old  female presenting to PAST in preparation for   LAPAROSCOPY BILATERAL TUBAL LIGATION on general anesthesia  by Dr. Johnston.  pt has 4 children youngest 21 months. had medical termination 8/2022, pt had excessive bleeding & did not pass all products& required D&C @ [facility].  reports is unable to take OCPs/patch dt HTN& has family hx breast cancer. pt states IUDs "do not stay in" so is opting for sterilization. pt is under care of neuro Dr Radha aquino migraines&  back issues who is referring to rheum...pt unsure if any rheum disorder?  pt has no appt until january, pt is unsure if should delay procedure until w/u complete, pt advised to contact surgeon to discuss    PATIENT CURRENTLY DENIES CHEST PAIN  SHORTNESS OF BREATH  PALPITATIONS,  DYSURIA, OR UPPER RESPIRATORY INFECTION IN PAST 2 WEEKS  EXERCISE  TOLERANCE  4 FLIGHT OF STAIRS  WITHOUT SHORTNESS OF BREATH  denies travel outside the [place] in the past 30 days  Patient denies any signs or symptoms of COVID 19 and denies contact with known positive individuals.  They have an appointment for repeat COVID testing pre-procedure and acknowledge its time and place.  They were instructed to quarantine pre-procedure, practice exposure control measures, continue to self-monitor and report any concerns to their proceduralist.  pt advised self quarantine till day of procedure  Anesthesia Alert  NO--Difficult Airway  NO--History of neck surgery or radiation  NO--Limited ROM of neck  NO--History of Malignant hyperthermia  NO--No personal or family history of Pseudocholinesterase deficiency.  NO--Prior Anesthesia Complication  NO--Latex Allergy  NO--Loose teeth  NO--History of Rheumatoid Arthritis  NO--Bleeding risk  NO--DERRELL  NO--Other_____    PT DENIES ANY RASHES, ABRASION, OR OPEN WOUNDS OR CUTS    AS PER THE PT, THIS IS HIS/HER COMPLETE MEDICAL AND SURGICAL HX, INCLUDING MEDICATIONS PRESCRIBED AND OVER THE COUNTER    Patient verbalized understanding of instructions and was given the opportunity to ask questions and have them answered.    pt denies any suicidal ideation or thoughts, pt states not a threat to self or others

## 2022-11-25 ENCOUNTER — EMERGENCY (EMERGENCY)
Facility: HOSPITAL | Age: 32
LOS: 0 days | Discharge: HOME | End: 2022-11-25
Attending: EMERGENCY MEDICINE | Admitting: EMERGENCY MEDICINE

## 2022-11-25 VITALS
DIASTOLIC BLOOD PRESSURE: 85 MMHG | TEMPERATURE: 98 F | HEART RATE: 85 BPM | SYSTOLIC BLOOD PRESSURE: 148 MMHG | RESPIRATION RATE: 18 BRPM | OXYGEN SATURATION: 100 %

## 2022-11-25 VITALS
HEART RATE: 100 BPM | RESPIRATION RATE: 18 BRPM | SYSTOLIC BLOOD PRESSURE: 139 MMHG | HEIGHT: 68 IN | TEMPERATURE: 97 F | DIASTOLIC BLOOD PRESSURE: 89 MMHG | OXYGEN SATURATION: 100 %

## 2022-11-25 DIAGNOSIS — Z98.890 OTHER SPECIFIED POSTPROCEDURAL STATES: Chronic | ICD-10-CM

## 2022-11-25 DIAGNOSIS — D64.9 ANEMIA, UNSPECIFIED: ICD-10-CM

## 2022-11-25 DIAGNOSIS — K21.9 GASTRO-ESOPHAGEAL REFLUX DISEASE WITHOUT ESOPHAGITIS: ICD-10-CM

## 2022-11-25 DIAGNOSIS — G43.909 MIGRAINE, UNSPECIFIED, NOT INTRACTABLE, WITHOUT STATUS MIGRAINOSUS: ICD-10-CM

## 2022-11-25 DIAGNOSIS — I10 ESSENTIAL (PRIMARY) HYPERTENSION: ICD-10-CM

## 2022-11-25 DIAGNOSIS — Z91.013 ALLERGY TO SEAFOOD: ICD-10-CM

## 2022-11-25 DIAGNOSIS — R53.83 OTHER FATIGUE: ICD-10-CM

## 2022-11-25 DIAGNOSIS — Z91.018 ALLERGY TO OTHER FOODS: ICD-10-CM

## 2022-11-25 DIAGNOSIS — Z86.2 PERSONAL HISTORY OF DISEASES OF THE BLOOD AND BLOOD-FORMING ORGANS AND CERTAIN DISORDERS INVOLVING THE IMMUNE MECHANISM: ICD-10-CM

## 2022-11-25 DIAGNOSIS — Z90.49 ACQUIRED ABSENCE OF OTHER SPECIFIED PARTS OF DIGESTIVE TRACT: Chronic | ICD-10-CM

## 2022-11-25 DIAGNOSIS — R42 DIZZINESS AND GIDDINESS: ICD-10-CM

## 2022-11-25 DIAGNOSIS — J45.909 UNSPECIFIED ASTHMA, UNCOMPLICATED: ICD-10-CM

## 2022-11-25 PROBLEM — M32.9 SYSTEMIC LUPUS ERYTHEMATOSUS, UNSPECIFIED: Chronic | Status: ACTIVE | Noted: 2022-11-23

## 2022-11-25 LAB
ALBUMIN SERPL ELPH-MCNC: 4.2 G/DL — SIGNIFICANT CHANGE UP (ref 3.5–5.2)
ALP SERPL-CCNC: 111 U/L — SIGNIFICANT CHANGE UP (ref 30–115)
ALT FLD-CCNC: 7 U/L — SIGNIFICANT CHANGE UP (ref 0–41)
ANION GAP SERPL CALC-SCNC: 9 MMOL/L — SIGNIFICANT CHANGE UP (ref 7–14)
APTT BLD: 30.6 SEC — SIGNIFICANT CHANGE UP (ref 27–39.2)
AST SERPL-CCNC: 10 U/L — SIGNIFICANT CHANGE UP (ref 0–41)
BASOPHILS # BLD AUTO: 0.05 K/UL — SIGNIFICANT CHANGE UP (ref 0–0.2)
BASOPHILS NFR BLD AUTO: 0.6 % — SIGNIFICANT CHANGE UP (ref 0–1)
BILIRUB SERPL-MCNC: 0.3 MG/DL — SIGNIFICANT CHANGE UP (ref 0.2–1.2)
BLD GP AB SCN SERPL QL: SIGNIFICANT CHANGE UP
BUN SERPL-MCNC: 11 MG/DL — SIGNIFICANT CHANGE UP (ref 10–20)
CALCIUM SERPL-MCNC: 9 MG/DL — SIGNIFICANT CHANGE UP (ref 8.4–10.5)
CHLORIDE SERPL-SCNC: 105 MMOL/L — SIGNIFICANT CHANGE UP (ref 98–110)
CO2 SERPL-SCNC: 26 MMOL/L — SIGNIFICANT CHANGE UP (ref 17–32)
CREAT SERPL-MCNC: 0.8 MG/DL — SIGNIFICANT CHANGE UP (ref 0.7–1.5)
EGFR: 101 ML/MIN/1.73M2 — SIGNIFICANT CHANGE UP
EOSINOPHIL # BLD AUTO: 0.28 K/UL — SIGNIFICANT CHANGE UP (ref 0–0.7)
EOSINOPHIL NFR BLD AUTO: 3.2 % — SIGNIFICANT CHANGE UP (ref 0–8)
GLUCOSE SERPL-MCNC: 80 MG/DL — SIGNIFICANT CHANGE UP (ref 70–99)
HCT VFR BLD CALC: 26.6 % — LOW (ref 37–47)
HGB BLD-MCNC: 7.9 G/DL — LOW (ref 12–16)
IMM GRANULOCYTES NFR BLD AUTO: 0.2 % — SIGNIFICANT CHANGE UP (ref 0.1–0.3)
INR BLD: 1.11 RATIO — SIGNIFICANT CHANGE UP (ref 0.65–1.3)
LYMPHOCYTES # BLD AUTO: 3.51 K/UL — HIGH (ref 1.2–3.4)
LYMPHOCYTES # BLD AUTO: 40.2 % — SIGNIFICANT CHANGE UP (ref 20.5–51.1)
MCHC RBC-ENTMCNC: 21.1 PG — LOW (ref 27–31)
MCHC RBC-ENTMCNC: 29.7 G/DL — LOW (ref 32–37)
MCV RBC AUTO: 71.1 FL — LOW (ref 81–99)
MONOCYTES # BLD AUTO: 0.75 K/UL — HIGH (ref 0.1–0.6)
MONOCYTES NFR BLD AUTO: 8.6 % — SIGNIFICANT CHANGE UP (ref 1.7–9.3)
NEUTROPHILS # BLD AUTO: 4.13 K/UL — SIGNIFICANT CHANGE UP (ref 1.4–6.5)
NEUTROPHILS NFR BLD AUTO: 47.2 % — SIGNIFICANT CHANGE UP (ref 42.2–75.2)
NRBC # BLD: 0 /100 WBCS — SIGNIFICANT CHANGE UP (ref 0–0)
PLATELET # BLD AUTO: 436 K/UL — HIGH (ref 130–400)
POTASSIUM SERPL-MCNC: 4.1 MMOL/L — SIGNIFICANT CHANGE UP (ref 3.5–5)
POTASSIUM SERPL-SCNC: 4.1 MMOL/L — SIGNIFICANT CHANGE UP (ref 3.5–5)
PROT SERPL-MCNC: 7.1 G/DL — SIGNIFICANT CHANGE UP (ref 6–8)
PROTHROM AB SERPL-ACNC: 12.7 SEC — SIGNIFICANT CHANGE UP (ref 9.95–12.87)
RBC # BLD: 3.74 M/UL — LOW (ref 4.2–5.4)
RBC # FLD: 18.4 % — HIGH (ref 11.5–14.5)
SODIUM SERPL-SCNC: 140 MMOL/L — SIGNIFICANT CHANGE UP (ref 135–146)
WBC # BLD: 8.74 K/UL — SIGNIFICANT CHANGE UP (ref 4.8–10.8)
WBC # FLD AUTO: 8.74 K/UL — SIGNIFICANT CHANGE UP (ref 4.8–10.8)

## 2022-11-25 PROCEDURE — 99284 EMERGENCY DEPT VISIT MOD MDM: CPT

## 2022-11-25 RX ORDER — SODIUM CHLORIDE 9 MG/ML
1000 INJECTION, SOLUTION INTRAVENOUS ONCE
Refills: 0 | Status: COMPLETED | OUTPATIENT
Start: 2022-11-25 | End: 2022-11-25

## 2022-11-25 RX ADMIN — SODIUM CHLORIDE 1000 MILLILITER(S): 9 INJECTION, SOLUTION INTRAVENOUS at 15:07

## 2022-11-25 NOTE — ED PROVIDER NOTE - PATIENT PORTAL LINK FT
You can access the FollowMyHealth Patient Portal offered by [facility] by registering at the following website: [email or web address]. By joining [facility]’s FollowMyHealth portal, you will also be able to view your health information using other applications (apps) compatible with our system.

## 2022-11-25 NOTE — ED PROVIDER NOTE - NS ED ROS FT
Constitutional: (-) fever, (-) chills  Eyes: (-) visual changes  ENT: (-) nasal congestions  Cardiovascular: (-) chest pain, (-) syncope  Respiratory: (-) cough, (-) shortness of breath, (-) dyspnea,   Gastrointestinal: (-) vomiting, (-) diarrhea, (-)nausea,  Musculoskeletal: (-) neck pain, (-) back pain, (-) joint pain,  Integumentary: (-) rash, (-) edema, (-) bruises  Neurological: (-) headache, (-) loc, (+) dizziness, (-) tingling, (-)numbness,  Peripheral Vascular: (-) leg swelling  :  (-)dysuria,  (-) hematuria

## 2022-11-25 NOTE — ED PROVIDER NOTE - ATTENDING APP SHARED VISIT CONTRIBUTION OF CARE
33 yo f with pmh of gerd, anemia, htn, asthma, migraines, sent by PAST for anemia.  pt admits dizzy and weak x 2 days.  denies vaginal bleeding, rectal bleeding/dark stool.  pt had egd about 1 yr ago, never had cscope.  pt denies cp, but some mild LIN.  pt is also concerned because multiple doctors have told her she might have SLE, so waiting for rheum w/u.  no fever or chill.  no n/v/d.  no abd pain.  exam: nad, ncat, perrl, eomi, mmm, rrr, ctab, abd soft, nt, nd aox3, imp: pt with anemia, symptomatic, will check labs, transfuse, pt will need outpt anemia w/u, pt is on no ac or asa.

## 2022-11-25 NOTE — ED ADULT NURSE NOTE - NSIMPLEMENTINTERV_GEN_ALL_ED
Implemented All Universal Safety Interventions:  [place] to call system. Call bell, personal items and telephone within reach. Instruct patient to call for assistance. Room bathroom lighting operational. Non-slip footwear when patient is off stretcher. Physically safe environment: no spills, clutter or unnecessary equipment. Stretcher in lowest position, wheels locked, appropriate side rails in place.

## 2022-11-25 NOTE — ED PROVIDER NOTE - NSFOLLOWUPINSTRUCTIONS_ED_ALL_ED_FT
Please follow up with rheumatology and hematology int the next 1-3 days     Our Emergency Department Referral Coordinators will be reaching out to you in the next 24-48 hours from 9:00am to 5:00pm with a follow up appointment. Please expect a phone call from the hospital in that time frame. If you do not receive a call or if you have any questions or concerns, you can reach them at [phone] or [phone].   Anemia    WHAT YOU NEED TO KNOW:    Anemia is a low number of red blood cells or a low amount of hemoglobin in your red blood cells. Hemoglobin is a protein that helps carry oxygen throughout your body. Red blood cells use iron to create hemoglobin. Anemia may develop if your body does not have enough iron. It may also develop if your body does not make enough red blood cells or they die faster than your body can make them.     DISCHARGE INSTRUCTIONS:    Call 911 or have someone call 911 for any of the following:     You lose consciousness.      You have severe chest pain.    Return to the emergency department if:     You have dark or bloody bowel movements.        Contact your healthcare provider if:     Your symptoms are worse, even after treatment.      You have questions or concerns about your condition or care.    Medicines:     Iron or folic acid supplements help increase your red blood cell and hemoglobin levels.       Vitamin B12 injections may help boost your red blood cell level and decrease your symptoms. Ask your healthcare provider how to inject B12.      Take your medicine as directed. Contact your healthcare provider if you think your medicine is not helping or if you have side effects. Tell him of her if you are allergic to any medicine. Keep a list of the medicines, vitamins, and herbs you take. Include the amounts, and when and why you take them. Bring the list or the pill bottles to follow-up visits. Carry your medicine list with you in case of an emergency.    Prevent anemia: Eat healthy foods rich in iron and vitamin C. Nuts, meat, dark leafy green vegetables, and beans are high in iron and protein. Vitamin C helps your body absorb iron. Foods rich in vitamin C include oranges and other citrus fruits. Ask your healthcare provider for a list of other foods that are high in iron or vitamin C. Ask if you need to be on a special diet.     Follow up with your healthcare provider as directed: Write down your questions so you remember to ask them during your visits.        © Copyright [employer] 2019 All illustrations and images included in CareNotes are the copyrighted property of [email or web address] or [employer]

## 2022-11-25 NOTE — ED PROVIDER NOTE - OBJECTIVE STATEMENT
32 F hx anemia, GERD, HTN, asthma presents to ED for abnormal labs. pt sts she went for pre op labs 2 days ago and was called today stating her hgb was low and she needed to come to ED for transfusion. pt sts she has been feeling light headed and has had fatigue over the last few days but thought nothing of it. here in ED pt endorses feeling tired but has no other complaints or discomfort.

## 2022-11-25 NOTE — ED PROVIDER NOTE - CLINICAL SUMMARY MEDICAL DECISION MAKING FREE TEXT BOX
pt with anemia, symptomatic, will check labs, transfuse, pt will need outpt anemia w/u, pt is on no ac or asa.

## 2022-11-25 NOTE — ED PROVIDER NOTE - PHYSICAL EXAMINATION
VITAL SIGNS: I have reviewed nursing notes and confirm.  CONSTITUTIONAL: in no acute distress.  SKIN: Skin exam is warm and dry, no acute rash. pt appears pale   HEAD: Normocephalic; atraumatic.  EYES: PERRL, EOM intact; conjunctiva pale  and sclera clear.  ENT: No nasal discharge; airway clear.  NECK: Supple; non tender.  CARD: S1, S2 normal; no murmurs, gallops, or rubs. Regular rate and rhythm.  RESP: No wheezes, rales or rhonchi. Speaking in full sentences.   ABD:  soft; non-distended; non-tender; No rebound or guarding.  EXT: Normal ROM. No clubbing, cyanosis or edema.  NEURO: Alert, oriented. Grossly unremarkable. No focal deficits.

## 2022-12-21 ENCOUNTER — APPOINTMENT (OUTPATIENT)
Dept: HEMATOLOGY ONCOLOGY | Facility: CLINIC | Age: 32
End: 2022-12-21

## 2022-12-21 VITALS
HEIGHT: 69 IN | WEIGHT: 202 LBS | HEART RATE: 92 BPM | TEMPERATURE: 97.4 F | RESPIRATION RATE: 14 BRPM | BODY MASS INDEX: 29.92 KG/M2 | DIASTOLIC BLOOD PRESSURE: 91 MMHG | SYSTOLIC BLOOD PRESSURE: 162 MMHG

## 2022-12-21 DIAGNOSIS — Z00.00 ENCOUNTER FOR GENERAL ADULT MEDICAL EXAMINATION W/OUT ABNORMAL FINDINGS: ICD-10-CM

## 2022-12-21 PROCEDURE — 99204 OFFICE O/P NEW MOD 45 MIN: CPT

## 2022-12-21 NOTE — REVIEW OF SYSTEMS
[Fatigue] : fatigue [Recent Change In Weight] : ~T no recent weight change [Joint Pain] : joint pain [Negative] : Allergic/Immunologic

## 2022-12-21 NOTE — ASSESSMENT
[FreeTextEntry1] : Impression\par Chronic anemia likely secondary to iron deficiency from menstrual blood loss with possible component of malabsorption from previous H. pylori, also blood loss from peptic ulcer disease, as well as malabsorption from PPI use as well as pregnancy demand in the past\par \par Plan\par -Given the severity of her anemia also some abdominal discomfort from oral iron and may be possible malabsorption to iron from PPI use recommended intravenous iron and after discussion decided to proceed with Feraheme 1020 mg 1 dose with premedication of hydrocortisone 100 mg.  Risk of anaphylaxis discussed.  If insurance does not pay for Feraheme will offer Venofer\par -She is pending an endoscopy with her gastroenterologist\par -She will return in 6 weeks for just blood work and to see me in 3 months\par -We will also check CBC with iron studies today, no recent iron studies reported\par -She can stop taking oral iron

## 2022-12-21 NOTE — HISTORY OF PRESENT ILLNESS
[de-identified] : CC: I have anemia \par \par This is a 32 year old female with history of back pain, Migraines, sciatica, and anemia. She has been folowed by Neurolology for possible stroke, she was seen by rhematology in the past as well.\par \par She had blood work done on November 25, 2022 that showed microcytic anemia with hemoglobin 7.9 platelets of 436.  She has been anemic for quite some time had a hemoglobin of 9.9 in 2017 but recently became microcytic. She had ended up having a blood transfusion she had one unit. \par \par For months ago she had an ectopic pregnancy and needed a D and C and needed blood transfusion at that time she was in 5 range. \par \par No iron studies available for review\par \par She has been on oral iron 3 times a day since about one week. \par \par She has 4 children and was pregnant 6 times, her youngest is 22 months old. She had high BP during pregnancy. Had bad nausea and vomiting. kids are 4-6 years apart. Her periods are very heavy in past, they lasted for 7 days, uses a tampone and a pad every 2 hours, and she had clots.  recently  normal \par \par She has gastritis and pending Endoscopy she had PUD as well. She hasd H pylori as well that was iradicated.  She takes protonix. \par \par denies any bleeding issues, had no issues with bleeding including dental extractions or gallbader surgery, all vagianl deliveries. \par \par Mom had issues with internal bleeding. \par \par She used to have an IUD  in past but it fell out.  She is now on OCP low estogen. \par \par Mom also had ovarian cancer and Mr. Webb tells me she was checked for BRCA and was negative\par \par She has GI upset with oral iron

## 2022-12-22 LAB
ABO + RH PNL BLD: NORMAL
BASOPHILS # BLD AUTO: 0.04 K/UL
BASOPHILS NFR BLD AUTO: 0.5 %
BLD GP AB SCN SERPL QL: NORMAL
EOSINOPHIL # BLD AUTO: 0.22 K/UL
EOSINOPHIL NFR BLD AUTO: 2.5 %
FERRITIN SERPL-MCNC: 10 NG/ML
HCT VFR BLD CALC: 28.7 %
HGB BLD-MCNC: 8.6 G/DL
IMM GRANULOCYTES NFR BLD AUTO: 0.2 %
IRON SATN MFR SERPL: 5 %
IRON SERPL-MCNC: 21 UG/DL
LYMPHOCYTES # BLD AUTO: 4.02 K/UL
LYMPHOCYTES NFR BLD AUTO: 46 %
MAN DIFF?: NORMAL
MCHC RBC-ENTMCNC: 21.3 PG
MCHC RBC-ENTMCNC: 30 G/DL
MCV RBC AUTO: 71.2 FL
MONOCYTES # BLD AUTO: 0.55 K/UL
MONOCYTES NFR BLD AUTO: 6.3 %
NEUTROPHILS # BLD AUTO: 3.89 K/UL
NEUTROPHILS NFR BLD AUTO: 44.5 %
PLATELET # BLD AUTO: 354 K/UL
RBC # BLD: 4.03 M/UL
RBC # FLD: 21.4 %
TIBC SERPL-MCNC: 420 UG/DL
UIBC SERPL-MCNC: 399 UG/DL
WBC # FLD AUTO: 8.74 K/UL

## 2022-12-27 NOTE — ED ADULT TRIAGE NOTE - PAIN RATING/NUMBER SCALE (0-10): REST
----- Message from Angel Maddox sent at 8/21/2020  9:19 AM CDT -----  Regarding: Needs to speak with the nurse.  Type: Needs Medical Advice    Who Called: Sushma with office of public health  [phone]       Additional Information:     Advised  needs to speak with the nurse about the ptnt, wouldn't say what about.    
Call placed to [facility] order for Adderall XR 20mg.    Rx reloaded with Pt requested pharmacy and routing HP to provider.    Pt updated in portal message.  
SONG Ordaz / returning her call 08/21/20  
5

## 2023-01-04 ENCOUNTER — APPOINTMENT (OUTPATIENT)
Dept: NEUROLOGY | Facility: CLINIC | Age: 33
End: 2023-01-04

## 2023-01-06 ENCOUNTER — OUTPATIENT (OUTPATIENT)
Dept: OUTPATIENT SERVICES | Facility: HOSPITAL | Age: 33
LOS: 1 days | End: 2023-01-06

## 2023-01-06 ENCOUNTER — APPOINTMENT (OUTPATIENT)
Dept: INFUSION THERAPY | Facility: CLINIC | Age: 33
End: 2023-01-06

## 2023-01-06 DIAGNOSIS — D50.0 IRON DEFICIENCY ANEMIA SECONDARY TO BLOOD LOSS (CHRONIC): ICD-10-CM

## 2023-01-06 DIAGNOSIS — Z98.890 OTHER SPECIFIED POSTPROCEDURAL STATES: Chronic | ICD-10-CM

## 2023-01-06 DIAGNOSIS — Z90.49 ACQUIRED ABSENCE OF OTHER SPECIFIED PARTS OF DIGESTIVE TRACT: Chronic | ICD-10-CM

## 2023-01-06 RX ORDER — FERUMOXYTOL 510 MG/17ML
1020 INJECTION INTRAVENOUS ONCE
Refills: 0 | Status: COMPLETED | OUTPATIENT
Start: 2023-01-06 | End: 2023-01-06

## 2023-01-06 RX ORDER — HYDROCORTISONE 20 MG
100 TABLET ORAL ONCE
Refills: 0 | Status: COMPLETED | OUTPATIENT
Start: 2023-01-06 | End: 2023-01-06

## 2023-01-06 RX ADMIN — Medication 100 MILLIGRAM(S): at 16:37

## 2023-01-06 RX ADMIN — FERUMOXYTOL 178.67 MILLIGRAM(S): 510 INJECTION INTRAVENOUS at 16:37

## 2023-02-10 ENCOUNTER — APPOINTMENT (OUTPATIENT)
Dept: PAIN MANAGEMENT | Facility: CLINIC | Age: 33
End: 2023-02-10
Payer: MEDICAID

## 2023-02-10 VITALS
BODY MASS INDEX: 29.92 KG/M2 | WEIGHT: 202 LBS | HEIGHT: 69 IN | DIASTOLIC BLOOD PRESSURE: 103 MMHG | HEART RATE: 69 BPM | SYSTOLIC BLOOD PRESSURE: 152 MMHG

## 2023-02-10 DIAGNOSIS — M51.26 OTHER INTERVERTEBRAL DISC DISPLACEMENT, LUMBAR REGION: ICD-10-CM

## 2023-02-10 PROCEDURE — 99213 OFFICE O/P EST LOW 20 MIN: CPT

## 2023-02-14 NOTE — DISCUSSION/SUMMARY
[de-identified] : A discussion regarding available pain management treatment options occurred with the patient.  These included interventional, rehabilitative, pharmacological, and alternative modalities. We will proceed with the following:\par \par Interventional treatment options:\par Treatment options were discussed. The patient has been having persistent, severe low back pain and lumbar radicular pain that has minimally improved with conservative therapy thus far (including physical therapy, home stretching and anti-inflammatory medications. The patient was given the option of proceeding with a lumbar epidural steroid injection to try and get the patient some pain relief. The risks and benefits were discussed, which included the associated problems with steroids, bleeding, nerve injury, lack of improvement with pain, and 0.5% chance of a post dural puncture headache. \par \par Rehabilitative options:\par - continue physical therapy / home exercise therapy\par Patient given specific exercises to do including but not limited to: side plank, Quadruped arm/leg raise, Extension exercise, and Glut Bridges \par \par - will f/u after injection\par \par AREN Fermin attest that this documentation has been prepared under the direction and in the presence of provider Dr. Harsha Danrell. \par The documentation recorded by the scribe in my presence, accurately reflects the service I personally performed, and the decisions made by me with my edits as appropriate. \par \par Harsha Darnell, DO\par

## 2023-02-14 NOTE — PHYSICAL EXAM
[de-identified] : Lumbar Spine Exam:\par Inspection:\par erythema (-)\par ecchymosis (-)\par rashes (-)\par \par Palpation:\par Midline lumbar tenderness:             (-)\par paraspinal tenderness:                  L (+) ; R (+)\par sciatic nerve tenderness :             L (+) ; R (+)\par SI joint tenderness:                        L (+) ; R (+)\par GTB tenderness:                            L (-);  R (-)\par \par normal ROM, albeit painful in lumbar flexion\par \par Strength:\par 5/5 throughout all muscle groups of the lower extremity\par \par                                    Right       Left   \par Hip Flexion:                (5/5)       (5/5)\par Quadriceps:               (5/5)       (5/5)\par Hamstrings:                (5/5)       (5/5)\par Ankle Dorsiflexion:     (5/5)       (5/5)\par EHL:                           (5/5)       (5/5)\par Ankle Plantarflexion:  (5/5)       (5/5)\par \par Special Tests:\par SLR:                           R (-) ; L (-)\par Facet loading:            R (-) ; L (-)\par ELY test:               R (-) ; L (-)\par \par Neurologic:\par Reflexes normal and symmetric \par \par Gait:\par non- antalgic gait\par ambulates w/o assistive device

## 2023-02-14 NOTE — DATA REVIEWED
[FreeTextEntry1] : Lumbar spine MRI\par Small subligamentous disc herniation central at L5-S1 w/ slight thecal sac compression

## 2023-02-14 NOTE — HISTORY OF PRESENT ILLNESS
[FreeTextEntry1] : This patient presents to the Pain Clinic complaining of axial low back pain bilaterally, which refers to the bilateral buttock, lateral thigh, posterior thigh, and to the lower leg on the lateral side and occasionally to the lateral lower foot.  The patient experiences axial low back pain constantly all times throughout the day, but her symptoms that refer down the leg occur generally with prolonged sitting bending forward and at night when she lays down in bed.  She has had axial low back pain for a long time about a year or so without any precipitating event, but the pain at that time was more manageable.  Over the last 3 months at least her pain has become more persistent and more severe in nature about an 8 to a 9/10.  The pain now also has a worsening radiating pattern down the legs that is giving the patient pause and concerning her.  She states that the pain in the low back is a constant soreness and achiness as if she has been repeatedly punched in the back.  She says the pain that radiates down the leg is a talking/pulling or a stabbing sensation.  She also reports decreased sensation along the lateral thighs  On both sides.  She has been taking NSAIDs  That was physician directed daily for the last 2 months after seeing an orthopedic doctor with 0 to minimal benefit, but continues to take it because her pain is very much affecting her quality of life and ability to perform her duties at work.  At work she sits down for many hours, which is difficult for her.\par \par The patient also complains of left knee pain that had also started over 6 months ago and likely close to a year ago.  There was no precipitating injury or trauma that she can recall.  The pain had been mild to moderate 4 months but similar to the low back pain has been more persistent and more severe over the last few months.  The patient is taking NSAIDs for pain as needed, but is not very effective at this time.  The pain is described as sharp aching and very limiting when standing and walking.  She denies any catching or clicking or instability.  Patient has not fallen or feel like her knee is going to " give out." the pain is made worse with standing and walking and can be relieved by about 30-40% by sitting down.  Pain is about a 6 out of 10 on the pain scale on average and can be as bad as an 8/10 after prolonged standing or walking.\par \par 2/10/2023\par Ms Adelaida Webb is a 32 year old female presenting with a new injury. Ms. Webb bent over and pulled out her bed then went to stand up and felt sharp pain on back. The pain radiates down left leg to lower leg but not to the foot. Ms Son rates her current pain 9/10. She said  the pain is worse when sitting, and bending. Standing and walking is somewhat tolerable, but prolonged standing can worsen pain as well. She notes tenderness on the L side. \par \par mri review- very small subligamentous disc herniation central at L5-S1 with slight thecal sac compression\par xray of the knee unremarkable

## 2023-02-20 ENCOUNTER — APPOINTMENT (OUTPATIENT)
Dept: PAIN MANAGEMENT | Facility: CLINIC | Age: 33
End: 2023-02-20

## 2023-02-21 ENCOUNTER — APPOINTMENT (OUTPATIENT)
Dept: HEMATOLOGY ONCOLOGY | Facility: CLINIC | Age: 33
End: 2023-02-21

## 2023-02-28 NOTE — OB RN TRIAGE NOTE - NS_MEANSOFARRIVAL_OBGYN_ALL_OB
Ambulatory
[FreeTextEntry1] : 1) possesses low card/pulm risk for a low risk procedure.  He may proceed.  Went to PSTs, all of which were unremarkable.  Has good activity tolerance and no card/pulm syx.  Has had anesthesia in past and done well.  Please call if any medical issues arise.

## 2023-03-16 ENCOUNTER — APPOINTMENT (OUTPATIENT)
Dept: PAIN MANAGEMENT | Facility: CLINIC | Age: 33
End: 2023-03-16

## 2023-03-31 ENCOUNTER — APPOINTMENT (OUTPATIENT)
Dept: PAIN MANAGEMENT | Facility: CLINIC | Age: 33
End: 2023-03-31

## 2023-03-31 ENCOUNTER — NON-APPOINTMENT (OUTPATIENT)
Age: 33
End: 2023-03-31

## 2023-04-10 ENCOUNTER — APPOINTMENT (OUTPATIENT)
Dept: HEMATOLOGY ONCOLOGY | Facility: CLINIC | Age: 33
End: 2023-04-10

## 2023-04-12 ENCOUNTER — APPOINTMENT (OUTPATIENT)
Dept: HEMATOLOGY ONCOLOGY | Facility: CLINIC | Age: 33
End: 2023-04-12
Payer: MEDICAID

## 2023-04-12 ENCOUNTER — LABORATORY RESULT (OUTPATIENT)
Age: 33
End: 2023-04-12

## 2023-04-12 ENCOUNTER — OUTPATIENT (OUTPATIENT)
Dept: OUTPATIENT SERVICES | Facility: HOSPITAL | Age: 33
LOS: 1 days | End: 2023-04-12
Payer: MEDICAID

## 2023-04-12 VITALS
HEIGHT: 69 IN | DIASTOLIC BLOOD PRESSURE: 107 MMHG | BODY MASS INDEX: 30.07 KG/M2 | TEMPERATURE: 97 F | RESPIRATION RATE: 18 BRPM | WEIGHT: 203 LBS | HEART RATE: 77 BPM | SYSTOLIC BLOOD PRESSURE: 167 MMHG

## 2023-04-12 DIAGNOSIS — Z98.890 OTHER SPECIFIED POSTPROCEDURAL STATES: Chronic | ICD-10-CM

## 2023-04-12 DIAGNOSIS — D50.0 IRON DEFICIENCY ANEMIA SECONDARY TO BLOOD LOSS (CHRONIC): ICD-10-CM

## 2023-04-12 DIAGNOSIS — Z90.49 ACQUIRED ABSENCE OF OTHER SPECIFIED PARTS OF DIGESTIVE TRACT: Chronic | ICD-10-CM

## 2023-04-12 LAB
HCT VFR BLD CALC: 37.1 %
HGB BLD-MCNC: 12.2 G/DL
MCHC RBC-ENTMCNC: 29.3 PG
MCHC RBC-ENTMCNC: 32.9 G/DL
MCV RBC AUTO: 89 FL
PLATELET # BLD AUTO: 334 K/UL
PMV BLD: 10.4 FL
RBC # BLD: 4.17 M/UL
RBC # FLD: 14.6 %
WBC # FLD AUTO: 8.9 K/UL

## 2023-04-12 PROCEDURE — 83550 IRON BINDING TEST: CPT

## 2023-04-12 PROCEDURE — 83540 ASSAY OF IRON: CPT

## 2023-04-12 PROCEDURE — 86900 BLOOD TYPING SEROLOGIC ABO: CPT

## 2023-04-12 PROCEDURE — 86901 BLOOD TYPING SEROLOGIC RH(D): CPT

## 2023-04-12 PROCEDURE — 86850 RBC ANTIBODY SCREEN: CPT

## 2023-04-12 PROCEDURE — 99214 OFFICE O/P EST MOD 30 MIN: CPT

## 2023-04-12 PROCEDURE — 85027 COMPLETE CBC AUTOMATED: CPT

## 2023-04-12 PROCEDURE — 82728 ASSAY OF FERRITIN: CPT

## 2023-04-12 PROCEDURE — 36415 COLL VENOUS BLD VENIPUNCTURE: CPT

## 2023-04-13 DIAGNOSIS — D50.0 IRON DEFICIENCY ANEMIA SECONDARY TO BLOOD LOSS (CHRONIC): ICD-10-CM

## 2023-04-13 LAB
ABO + RH PNL BLD: NORMAL
BLD GP AB SCN SERPL QL: NORMAL
FERRITIN SERPL-MCNC: 19 NG/ML
IRON SATN MFR SERPL: 19 %
IRON SERPL-MCNC: 65 UG/DL
TIBC SERPL-MCNC: 348 UG/DL
UIBC SERPL-MCNC: 283 UG/DL

## 2023-04-14 ENCOUNTER — APPOINTMENT (OUTPATIENT)
Dept: HEMATOLOGY ONCOLOGY | Facility: CLINIC | Age: 33
End: 2023-04-14

## 2023-04-14 NOTE — HISTORY OF PRESENT ILLNESS
[de-identified] : CC: I have anemia \par \par This is a 32 year old female with history of back pain, Migraines, sciatica, and anemia. She has been folowed by Neurolology for possible stroke, she was seen by rhematology in the past as well.\par \par She had blood work done on November 25, 2022 that showed microcytic anemia with hemoglobin 7.9 platelets of 436.  She has been anemic for quite some time had a hemoglobin of 9.9 in 2017 but recently became microcytic. She had ended up having a blood transfusion she had one unit. \par \par For months ago she had an ectopic pregnancy and needed a D and C and needed blood transfusion at that time she was in 5 range. \par \par No iron studies available for review\par \par She has been on oral iron 3 times a day since about one week. \par \par She has 4 children and was pregnant 6 times, her youngest is 22 months old. She had high BP during pregnancy. Had bad nausea and vomiting. kids are 4-6 years apart. Her periods are very heavy in past, they lasted for 7 days, uses a tampone and a pad every 2 hours, and she had clots.  recently  normal \par \par She has gastritis and pending Endoscopy she had PUD as well. She hasd H pylori as well that was iradicated.  She takes protonix. \par \par denies any bleeding issues, had no issues with bleeding including dental extractions or gallbader surgery, all vagianl deliveries. \par \par Mom had issues with internal bleeding. \par \par She used to have an IUD  in past but it fell out.  She is now on OCP low estogen. \par \par Mom also had ovarian cancer and Mr. Webb tells me she was checked for BRCA and was negative\par \par She has GI upset with oral iron [de-identified] : 04/12/2023\par Reports increasing fatigue and significantly heavy last menstrual period.

## 2023-04-14 NOTE — ASSESSMENT
[FreeTextEntry1] : # Chronic anemia likely secondary to iron deficiency from menstrual blood loss with possible component of malabsorption from previous H. pylori, also blood loss from peptic ulcer disease, as well as malabsorption from PPI use as well as pregnancy demand in the past.\par - Given the severity of her anemia also some abdominal discomfort from oral iron and may be possible malabsorption to iron from PPI use recommended intravenous iron and after discussion decided to proceed with Feraheme 1020 mg 1 dose with premedication of hydrocortisone 100 mg.  Risk of anaphylaxis discussed.  If insurance does not pay for Feraheme will offer Venofer\par - She is pending an endoscopy with her gastroenterologist.\par \par 04/12/2023 she presented today with symptomatic iron deficiency and report of increasing fatigue and significant bleeding heavy last menstrual period.\par \par PLAN:\par \par - Proceed with Feraheme 1020 mg IV 1 dose with premedication of Solu-Cortef 100 mg IV and Tylenol 650 mg PO.\par \par - No need for blood transfusion at this time.\par \par - Lab work today: CBC with type and screen for possible PRBC and ferritin and iron studies.\par \par RTC in 4 weeks after the infusion with CBC ferritin and iron studies

## 2023-04-19 ENCOUNTER — OUTPATIENT (OUTPATIENT)
Dept: OUTPATIENT SERVICES | Facility: HOSPITAL | Age: 33
LOS: 1 days | End: 2023-04-19
Payer: MEDICAID

## 2023-04-19 ENCOUNTER — APPOINTMENT (OUTPATIENT)
Dept: INFUSION THERAPY | Facility: CLINIC | Age: 33
End: 2023-04-19

## 2023-04-19 DIAGNOSIS — Z90.49 ACQUIRED ABSENCE OF OTHER SPECIFIED PARTS OF DIGESTIVE TRACT: Chronic | ICD-10-CM

## 2023-04-19 DIAGNOSIS — Z98.890 OTHER SPECIFIED POSTPROCEDURAL STATES: Chronic | ICD-10-CM

## 2023-04-19 DIAGNOSIS — D50.0 IRON DEFICIENCY ANEMIA SECONDARY TO BLOOD LOSS (CHRONIC): ICD-10-CM

## 2023-04-19 PROCEDURE — 96375 TX/PRO/DX INJ NEW DRUG ADDON: CPT

## 2023-04-19 PROCEDURE — 96365 THER/PROPH/DIAG IV INF INIT: CPT

## 2023-04-19 RX ORDER — HYDROCORTISONE 20 MG
100 TABLET ORAL ONCE
Refills: 0 | Status: COMPLETED | OUTPATIENT
Start: 2023-04-19 | End: 2023-04-19

## 2023-04-19 RX ORDER — FERUMOXYTOL 510 MG/17ML
1020 INJECTION INTRAVENOUS ONCE
Refills: 0 | Status: COMPLETED | OUTPATIENT
Start: 2023-04-19 | End: 2023-04-19

## 2023-04-19 RX ORDER — ACETAMINOPHEN 500 MG
650 TABLET ORAL ONCE
Refills: 0 | Status: COMPLETED | OUTPATIENT
Start: 2023-04-19 | End: 2023-04-19

## 2023-04-19 RX ADMIN — FERUMOXYTOL 1020 MILLIGRAM(S): 510 INJECTION INTRAVENOUS at 16:44

## 2023-04-19 RX ADMIN — FERUMOXYTOL 189.33 MILLIGRAM(S): 510 INJECTION INTRAVENOUS at 15:33

## 2023-04-19 RX ADMIN — Medication 100 MILLIGRAM(S): at 15:33

## 2023-04-19 RX ADMIN — Medication 100 MILLIGRAM(S): at 15:45

## 2023-04-19 RX ADMIN — Medication 650 MILLIGRAM(S): at 15:32

## 2023-04-21 ENCOUNTER — APPOINTMENT (OUTPATIENT)
Dept: HEMATOLOGY ONCOLOGY | Facility: CLINIC | Age: 33
End: 2023-04-21

## 2023-04-24 ENCOUNTER — APPOINTMENT (OUTPATIENT)
Dept: HEMATOLOGY ONCOLOGY | Facility: CLINIC | Age: 33
End: 2023-04-24

## 2023-05-17 ENCOUNTER — APPOINTMENT (OUTPATIENT)
Dept: HEMATOLOGY ONCOLOGY | Facility: CLINIC | Age: 33
End: 2023-05-17

## 2023-05-22 ENCOUNTER — APPOINTMENT (OUTPATIENT)
Dept: HEMATOLOGY ONCOLOGY | Facility: CLINIC | Age: 33
End: 2023-05-22

## 2023-07-07 ENCOUNTER — APPOINTMENT (OUTPATIENT)
Dept: HEMATOLOGY ONCOLOGY | Facility: CLINIC | Age: 33
End: 2023-07-07

## 2023-07-07 ENCOUNTER — OUTPATIENT (OUTPATIENT)
Dept: OUTPATIENT SERVICES | Facility: HOSPITAL | Age: 33
LOS: 1 days | End: 2023-07-07
Payer: MEDICAID

## 2023-07-07 ENCOUNTER — LABORATORY RESULT (OUTPATIENT)
Age: 33
End: 2023-07-07

## 2023-07-07 DIAGNOSIS — D50.0 IRON DEFICIENCY ANEMIA SECONDARY TO BLOOD LOSS (CHRONIC): ICD-10-CM

## 2023-07-07 DIAGNOSIS — Z98.890 OTHER SPECIFIED POSTPROCEDURAL STATES: Chronic | ICD-10-CM

## 2023-07-07 DIAGNOSIS — Z90.49 ACQUIRED ABSENCE OF OTHER SPECIFIED PARTS OF DIGESTIVE TRACT: Chronic | ICD-10-CM

## 2023-07-07 LAB
HCT VFR BLD CALC: 36.9 %
HGB BLD-MCNC: 12.4 G/DL
IRON SATN MFR SERPL: 38 %
IRON SERPL-MCNC: 100 UG/DL
MCHC RBC-ENTMCNC: 30.8 PG
MCHC RBC-ENTMCNC: 33.6 G/DL
MCV RBC AUTO: 91.8 FL
PLATELET # BLD AUTO: 285 K/UL
PMV BLD: 10.3 FL
RBC # BLD: 4.02 M/UL
RBC # FLD: 13.1 %
TIBC SERPL-MCNC: 263 UG/DL
UIBC SERPL-MCNC: 163 UG/DL
WBC # FLD AUTO: 5.66 K/UL

## 2023-07-07 PROCEDURE — 82728 ASSAY OF FERRITIN: CPT

## 2023-07-07 PROCEDURE — 83550 IRON BINDING TEST: CPT

## 2023-07-07 PROCEDURE — 83540 ASSAY OF IRON: CPT

## 2023-07-07 PROCEDURE — 85027 COMPLETE CBC AUTOMATED: CPT

## 2023-07-08 DIAGNOSIS — D50.0 IRON DEFICIENCY ANEMIA SECONDARY TO BLOOD LOSS (CHRONIC): ICD-10-CM

## 2023-07-10 LAB — FERRITIN SERPL-MCNC: 225 NG/ML

## 2023-07-12 ENCOUNTER — APPOINTMENT (OUTPATIENT)
Dept: HEMATOLOGY ONCOLOGY | Facility: CLINIC | Age: 33
End: 2023-07-12
Payer: MEDICAID

## 2023-07-12 ENCOUNTER — OUTPATIENT (OUTPATIENT)
Dept: OUTPATIENT SERVICES | Facility: HOSPITAL | Age: 33
LOS: 1 days | End: 2023-07-12
Payer: MEDICAID

## 2023-07-12 VITALS
HEIGHT: 69 IN | TEMPERATURE: 97.8 F | SYSTOLIC BLOOD PRESSURE: 142 MMHG | DIASTOLIC BLOOD PRESSURE: 78 MMHG | WEIGHT: 208 LBS | HEART RATE: 75 BPM | BODY MASS INDEX: 30.81 KG/M2 | RESPIRATION RATE: 16 BRPM

## 2023-07-12 DIAGNOSIS — D50.0 IRON DEFICIENCY ANEMIA SECONDARY TO BLOOD LOSS (CHRONIC): ICD-10-CM

## 2023-07-12 DIAGNOSIS — Z98.890 OTHER SPECIFIED POSTPROCEDURAL STATES: Chronic | ICD-10-CM

## 2023-07-12 DIAGNOSIS — Z90.49 ACQUIRED ABSENCE OF OTHER SPECIFIED PARTS OF DIGESTIVE TRACT: Chronic | ICD-10-CM

## 2023-07-12 PROCEDURE — 99213 OFFICE O/P EST LOW 20 MIN: CPT

## 2023-07-13 DIAGNOSIS — D50.0 IRON DEFICIENCY ANEMIA SECONDARY TO BLOOD LOSS (CHRONIC): ICD-10-CM

## 2023-07-16 NOTE — END OF VISIT
[FreeTextEntry3] :  I agree with the history and physical, and plan which I have reviewed and edited where appropriate.\par \par Presents for iron deficiency anemia she previously had Feraheme and CBC from this visit came back normal we remain on observation patient had endoscopic evaluations with GI.  She can RTC in 3 months

## 2023-07-16 NOTE — ASSESSMENT
[FreeTextEntry1] : # Chronic anemia likely secondary to iron deficiency from menstrual blood loss with possible component of malabsorption from previous H. pylori, also blood loss from peptic ulcer disease, as well as malabsorption from PPI use as well as pregnancy demand in the past.\par - Given the severity of her anemia also some abdominal discomfort from oral iron and may be possible malabsorption to iron from PPI use recommended intravenous iron and after discussion decided to proceed with Feraheme 1020 mg 1 dose with premedication of hydrocortisone 100 mg.  Risk of anaphylaxis discussed.  If insurance does not pay for Feraheme will offer Venofer\par - She is pending an endoscopy with her gastroenterologist.\par - 05/2023 improved S/P Feraheme 1020 mg IV 1 dose with premedication of Solu-Cortef 100 mg IV and Tylenol 650 mg PO.\par \par 07/12/2023 Labs reviewed and results discussed with the patient - remains clinically stable to continue current management. All questions were answered to satisfaction.\par \par PLAN:\par \par - continue observation.\par \par - Lab work today: CBC ferritin and iron studies.\par \par RTC in 3 months with CBC ferritin and iron studies

## 2023-10-11 ENCOUNTER — APPOINTMENT (OUTPATIENT)
Dept: HEMATOLOGY ONCOLOGY | Facility: CLINIC | Age: 33
End: 2023-10-11

## 2023-10-13 ENCOUNTER — APPOINTMENT (OUTPATIENT)
Dept: HEMATOLOGY ONCOLOGY | Facility: CLINIC | Age: 33
End: 2023-10-13

## 2023-10-30 ENCOUNTER — EMERGENCY (EMERGENCY)
Facility: HOSPITAL | Age: 33
LOS: 0 days | Discharge: HOME | End: 2023-10-30
Attending: STUDENT IN AN ORGANIZED HEALTH CARE EDUCATION/TRAINING PROGRAM
Payer: MEDICAID

## 2023-10-30 VITALS
HEART RATE: 94 BPM | OXYGEN SATURATION: 97 % | SYSTOLIC BLOOD PRESSURE: 128 MMHG | TEMPERATURE: 99 F | DIASTOLIC BLOOD PRESSURE: 93 MMHG

## 2023-10-30 VITALS
SYSTOLIC BLOOD PRESSURE: 146 MMHG | DIASTOLIC BLOOD PRESSURE: 96 MMHG | WEIGHT: 195.11 LBS | RESPIRATION RATE: 18 BRPM | HEART RATE: 105 BPM | TEMPERATURE: 99 F | HEIGHT: 69 IN | OXYGEN SATURATION: 100 %

## 2023-10-30 DIAGNOSIS — R11.2 NAUSEA WITH VOMITING, UNSPECIFIED: ICD-10-CM

## 2023-10-30 DIAGNOSIS — R19.5 OTHER FECAL ABNORMALITIES: ICD-10-CM

## 2023-10-30 DIAGNOSIS — Z87.11 PERSONAL HISTORY OF PEPTIC ULCER DISEASE: ICD-10-CM

## 2023-10-30 DIAGNOSIS — R10.9 UNSPECIFIED ABDOMINAL PAIN: ICD-10-CM

## 2023-10-30 DIAGNOSIS — R10.13 EPIGASTRIC PAIN: ICD-10-CM

## 2023-10-30 DIAGNOSIS — Z90.49 ACQUIRED ABSENCE OF OTHER SPECIFIED PARTS OF DIGESTIVE TRACT: Chronic | ICD-10-CM

## 2023-10-30 DIAGNOSIS — Z91.018 ALLERGY TO OTHER FOODS: ICD-10-CM

## 2023-10-30 DIAGNOSIS — R19.7 DIARRHEA, UNSPECIFIED: ICD-10-CM

## 2023-10-30 DIAGNOSIS — Z91.013 ALLERGY TO SEAFOOD: ICD-10-CM

## 2023-10-30 DIAGNOSIS — Z98.890 OTHER SPECIFIED POSTPROCEDURAL STATES: Chronic | ICD-10-CM

## 2023-10-30 LAB
ALBUMIN SERPL ELPH-MCNC: 4.2 G/DL — SIGNIFICANT CHANGE UP (ref 3.5–5.2)
ALBUMIN SERPL ELPH-MCNC: 4.2 G/DL — SIGNIFICANT CHANGE UP (ref 3.5–5.2)
ALP SERPL-CCNC: 98 U/L — SIGNIFICANT CHANGE UP (ref 30–115)
ALP SERPL-CCNC: 98 U/L — SIGNIFICANT CHANGE UP (ref 30–115)
ALT FLD-CCNC: 18 U/L — SIGNIFICANT CHANGE UP (ref 0–41)
ALT FLD-CCNC: 18 U/L — SIGNIFICANT CHANGE UP (ref 0–41)
ANION GAP SERPL CALC-SCNC: 12 MMOL/L — SIGNIFICANT CHANGE UP (ref 7–14)
ANION GAP SERPL CALC-SCNC: 12 MMOL/L — SIGNIFICANT CHANGE UP (ref 7–14)
AST SERPL-CCNC: 15 U/L — SIGNIFICANT CHANGE UP (ref 0–41)
AST SERPL-CCNC: 15 U/L — SIGNIFICANT CHANGE UP (ref 0–41)
BASOPHILS # BLD AUTO: 0.02 K/UL — SIGNIFICANT CHANGE UP (ref 0–0.2)
BASOPHILS # BLD AUTO: 0.02 K/UL — SIGNIFICANT CHANGE UP (ref 0–0.2)
BASOPHILS NFR BLD AUTO: 0.3 % — SIGNIFICANT CHANGE UP (ref 0–1)
BASOPHILS NFR BLD AUTO: 0.3 % — SIGNIFICANT CHANGE UP (ref 0–1)
BILIRUB SERPL-MCNC: 0.5 MG/DL — SIGNIFICANT CHANGE UP (ref 0.2–1.2)
BILIRUB SERPL-MCNC: 0.5 MG/DL — SIGNIFICANT CHANGE UP (ref 0.2–1.2)
BUN SERPL-MCNC: 12 MG/DL — SIGNIFICANT CHANGE UP (ref 10–20)
BUN SERPL-MCNC: 12 MG/DL — SIGNIFICANT CHANGE UP (ref 10–20)
CALCIUM SERPL-MCNC: 9 MG/DL — SIGNIFICANT CHANGE UP (ref 8.4–10.5)
CALCIUM SERPL-MCNC: 9 MG/DL — SIGNIFICANT CHANGE UP (ref 8.4–10.5)
CHLORIDE SERPL-SCNC: 103 MMOL/L — SIGNIFICANT CHANGE UP (ref 98–110)
CHLORIDE SERPL-SCNC: 103 MMOL/L — SIGNIFICANT CHANGE UP (ref 98–110)
CO2 SERPL-SCNC: 22 MMOL/L — SIGNIFICANT CHANGE UP (ref 17–32)
CO2 SERPL-SCNC: 22 MMOL/L — SIGNIFICANT CHANGE UP (ref 17–32)
CREAT SERPL-MCNC: 0.8 MG/DL — SIGNIFICANT CHANGE UP (ref 0.7–1.5)
CREAT SERPL-MCNC: 0.8 MG/DL — SIGNIFICANT CHANGE UP (ref 0.7–1.5)
EGFR: 100 ML/MIN/1.73M2 — SIGNIFICANT CHANGE UP
EGFR: 100 ML/MIN/1.73M2 — SIGNIFICANT CHANGE UP
EOSINOPHIL # BLD AUTO: 0.02 K/UL — SIGNIFICANT CHANGE UP (ref 0–0.7)
EOSINOPHIL # BLD AUTO: 0.02 K/UL — SIGNIFICANT CHANGE UP (ref 0–0.7)
EOSINOPHIL NFR BLD AUTO: 0.3 % — SIGNIFICANT CHANGE UP (ref 0–8)
EOSINOPHIL NFR BLD AUTO: 0.3 % — SIGNIFICANT CHANGE UP (ref 0–8)
GLUCOSE SERPL-MCNC: 90 MG/DL — SIGNIFICANT CHANGE UP (ref 70–99)
GLUCOSE SERPL-MCNC: 90 MG/DL — SIGNIFICANT CHANGE UP (ref 70–99)
HCG SERPL QL: NEGATIVE — SIGNIFICANT CHANGE UP
HCG SERPL QL: NEGATIVE — SIGNIFICANT CHANGE UP
HCT VFR BLD CALC: 38.7 % — SIGNIFICANT CHANGE UP (ref 37–47)
HCT VFR BLD CALC: 38.7 % — SIGNIFICANT CHANGE UP (ref 37–47)
HGB BLD-MCNC: 13.2 G/DL — SIGNIFICANT CHANGE UP (ref 12–16)
HGB BLD-MCNC: 13.2 G/DL — SIGNIFICANT CHANGE UP (ref 12–16)
IMM GRANULOCYTES NFR BLD AUTO: 0.3 % — SIGNIFICANT CHANGE UP (ref 0.1–0.3)
IMM GRANULOCYTES NFR BLD AUTO: 0.3 % — SIGNIFICANT CHANGE UP (ref 0.1–0.3)
LIDOCAIN IGE QN: 16 U/L — SIGNIFICANT CHANGE UP (ref 7–60)
LIDOCAIN IGE QN: 16 U/L — SIGNIFICANT CHANGE UP (ref 7–60)
LYMPHOCYTES # BLD AUTO: 0.86 K/UL — LOW (ref 1.2–3.4)
LYMPHOCYTES # BLD AUTO: 0.86 K/UL — LOW (ref 1.2–3.4)
LYMPHOCYTES # BLD AUTO: 10.9 % — LOW (ref 20.5–51.1)
LYMPHOCYTES # BLD AUTO: 10.9 % — LOW (ref 20.5–51.1)
MCHC RBC-ENTMCNC: 31.4 PG — HIGH (ref 27–31)
MCHC RBC-ENTMCNC: 31.4 PG — HIGH (ref 27–31)
MCHC RBC-ENTMCNC: 34.1 G/DL — SIGNIFICANT CHANGE UP (ref 32–37)
MCHC RBC-ENTMCNC: 34.1 G/DL — SIGNIFICANT CHANGE UP (ref 32–37)
MCV RBC AUTO: 91.9 FL — SIGNIFICANT CHANGE UP (ref 81–99)
MCV RBC AUTO: 91.9 FL — SIGNIFICANT CHANGE UP (ref 81–99)
MONOCYTES # BLD AUTO: 0.44 K/UL — SIGNIFICANT CHANGE UP (ref 0.1–0.6)
MONOCYTES # BLD AUTO: 0.44 K/UL — SIGNIFICANT CHANGE UP (ref 0.1–0.6)
MONOCYTES NFR BLD AUTO: 5.6 % — SIGNIFICANT CHANGE UP (ref 1.7–9.3)
MONOCYTES NFR BLD AUTO: 5.6 % — SIGNIFICANT CHANGE UP (ref 1.7–9.3)
NEUTROPHILS # BLD AUTO: 6.54 K/UL — HIGH (ref 1.4–6.5)
NEUTROPHILS # BLD AUTO: 6.54 K/UL — HIGH (ref 1.4–6.5)
NEUTROPHILS NFR BLD AUTO: 82.6 % — HIGH (ref 42.2–75.2)
NEUTROPHILS NFR BLD AUTO: 82.6 % — HIGH (ref 42.2–75.2)
NRBC # BLD: 0 /100 WBCS — SIGNIFICANT CHANGE UP (ref 0–0)
NRBC # BLD: 0 /100 WBCS — SIGNIFICANT CHANGE UP (ref 0–0)
PLATELET # BLD AUTO: 267 K/UL — SIGNIFICANT CHANGE UP (ref 130–400)
PLATELET # BLD AUTO: 267 K/UL — SIGNIFICANT CHANGE UP (ref 130–400)
PMV BLD: 10.4 FL — SIGNIFICANT CHANGE UP (ref 7.4–10.4)
PMV BLD: 10.4 FL — SIGNIFICANT CHANGE UP (ref 7.4–10.4)
POTASSIUM SERPL-MCNC: 4.4 MMOL/L — SIGNIFICANT CHANGE UP (ref 3.5–5)
POTASSIUM SERPL-MCNC: 4.4 MMOL/L — SIGNIFICANT CHANGE UP (ref 3.5–5)
POTASSIUM SERPL-SCNC: 4.4 MMOL/L — SIGNIFICANT CHANGE UP (ref 3.5–5)
POTASSIUM SERPL-SCNC: 4.4 MMOL/L — SIGNIFICANT CHANGE UP (ref 3.5–5)
PROT SERPL-MCNC: 7.4 G/DL — SIGNIFICANT CHANGE UP (ref 6–8)
PROT SERPL-MCNC: 7.4 G/DL — SIGNIFICANT CHANGE UP (ref 6–8)
RBC # BLD: 4.21 M/UL — SIGNIFICANT CHANGE UP (ref 4.2–5.4)
RBC # BLD: 4.21 M/UL — SIGNIFICANT CHANGE UP (ref 4.2–5.4)
RBC # FLD: 12 % — SIGNIFICANT CHANGE UP (ref 11.5–14.5)
RBC # FLD: 12 % — SIGNIFICANT CHANGE UP (ref 11.5–14.5)
SODIUM SERPL-SCNC: 137 MMOL/L — SIGNIFICANT CHANGE UP (ref 135–146)
SODIUM SERPL-SCNC: 137 MMOL/L — SIGNIFICANT CHANGE UP (ref 135–146)
WBC # BLD: 7.9 K/UL — SIGNIFICANT CHANGE UP (ref 4.8–10.8)
WBC # BLD: 7.9 K/UL — SIGNIFICANT CHANGE UP (ref 4.8–10.8)
WBC # FLD AUTO: 7.9 K/UL — SIGNIFICANT CHANGE UP (ref 4.8–10.8)
WBC # FLD AUTO: 7.9 K/UL — SIGNIFICANT CHANGE UP (ref 4.8–10.8)

## 2023-10-30 PROCEDURE — 99284 EMERGENCY DEPT VISIT MOD MDM: CPT | Mod: 25

## 2023-10-30 PROCEDURE — 83690 ASSAY OF LIPASE: CPT

## 2023-10-30 PROCEDURE — 80053 COMPREHEN METABOLIC PANEL: CPT

## 2023-10-30 PROCEDURE — 84703 CHORIONIC GONADOTROPIN ASSAY: CPT

## 2023-10-30 PROCEDURE — 99285 EMERGENCY DEPT VISIT HI MDM: CPT

## 2023-10-30 PROCEDURE — 96372 THER/PROPH/DIAG INJ SC/IM: CPT | Mod: XU

## 2023-10-30 PROCEDURE — 74177 CT ABD & PELVIS W/CONTRAST: CPT | Mod: MA

## 2023-10-30 PROCEDURE — 85025 COMPLETE CBC W/AUTO DIFF WBC: CPT

## 2023-10-30 PROCEDURE — 74177 CT ABD & PELVIS W/CONTRAST: CPT | Mod: 26,MA

## 2023-10-30 PROCEDURE — 96375 TX/PRO/DX INJ NEW DRUG ADDON: CPT

## 2023-10-30 PROCEDURE — 36415 COLL VENOUS BLD VENIPUNCTURE: CPT

## 2023-10-30 PROCEDURE — 96374 THER/PROPH/DIAG INJ IV PUSH: CPT

## 2023-10-30 RX ORDER — KETOROLAC TROMETHAMINE 30 MG/ML
30 SYRINGE (ML) INJECTION ONCE
Refills: 0 | Status: DISCONTINUED | OUTPATIENT
Start: 2023-10-30 | End: 2023-10-30

## 2023-10-30 RX ORDER — ONDANSETRON 8 MG/1
1 TABLET, FILM COATED ORAL
Qty: 15 | Refills: 0
Start: 2023-10-30 | End: 2023-11-03

## 2023-10-30 RX ORDER — ONDANSETRON 8 MG/1
4 TABLET, FILM COATED ORAL ONCE
Refills: 0 | Status: COMPLETED | OUTPATIENT
Start: 2023-10-30 | End: 2023-10-30

## 2023-10-30 RX ORDER — KETOROLAC TROMETHAMINE 30 MG/ML
15 SYRINGE (ML) INJECTION ONCE
Refills: 0 | Status: DISCONTINUED | OUTPATIENT
Start: 2023-10-30 | End: 2023-10-30

## 2023-10-30 RX ORDER — FAMOTIDINE 10 MG/ML
20 INJECTION INTRAVENOUS ONCE
Refills: 0 | Status: COMPLETED | OUTPATIENT
Start: 2023-10-30 | End: 2023-10-30

## 2023-10-30 RX ORDER — SODIUM CHLORIDE 9 MG/ML
2000 INJECTION, SOLUTION INTRAVENOUS ONCE
Refills: 0 | Status: COMPLETED | OUTPATIENT
Start: 2023-10-30 | End: 2023-10-30

## 2023-10-30 RX ADMIN — Medication 15 MILLIGRAM(S): at 22:35

## 2023-10-30 RX ADMIN — Medication 30 MILLIGRAM(S): at 13:20

## 2023-10-30 RX ADMIN — ONDANSETRON 4 MILLIGRAM(S): 8 TABLET, FILM COATED ORAL at 13:19

## 2023-10-30 RX ADMIN — SODIUM CHLORIDE 2000 MILLILITER(S): 9 INJECTION, SOLUTION INTRAVENOUS at 13:19

## 2023-10-30 RX ADMIN — Medication 20 MILLIGRAM(S): at 18:50

## 2023-10-30 RX ADMIN — FAMOTIDINE 20 MILLIGRAM(S): 10 INJECTION INTRAVENOUS at 13:19

## 2023-10-30 NOTE — ED PROVIDER NOTE - OBJECTIVE STATEMENT
31-year-old female past medical history of peptic ulcer disease with treatment for H. pylori endoscopy year ago with normal specimens coming in here for 1 week of abdominal pain nausea vomiting diarrhea.  Patient has 2-year-old son who had similar symptoms but coming in for worsening pain.  No other complaints.  Tolerating water but not solids. 33-year-old female past medical history of peptic ulcer disease with treatment for H. pylori endoscopy year ago with normal specimens coming in here for 1 week of abdominal pain nausea vomiting diarrhea.  Patient has 2-year-old son who had similar symptoms but coming in for worsening pain.  No other complaints.  Tolerating water but not solids.

## 2023-10-30 NOTE — ED PROVIDER NOTE - ATTENDING WITH...
Patient called and stated he went to [place] on Sunday and was tested for COVID  I explained to him that he is quarantining for 5 days and then mask for 5 days  Patient stated that he was starting to feel better  I told him if he felt he wasn't improving or he just had questions to give us a call  Resident

## 2023-10-30 NOTE — ED PROVIDER NOTE - CARE PROVIDER_API CALL
Christine Monteiro  Internal Medicine  [facility] [place] [address]  Phone: [phone]  Fax: [phone]  Follow Up Time: 1-3 Days

## 2023-10-30 NOTE — ED PROVIDER NOTE - CLINICAL SUMMARY MEDICAL DECISION MAKING FREE TEXT BOX
Patient endorsed to me by Dr. Cho pending CT.  32 yo F p/w epigastric abd pain associated with NBNB vomitus and diarrhea that began a few days prior, states stools darkened after she took pepto bismol. Labs stable. Imaging with dilated small bowels however patient nontender on exam- likely gastroenteritis. Offered and recommended admission however patient declined, preferring to go home. patient aware that likely viral as both of her children with similar GI symptoms. No fevers. Patient tolerated oral intake. at this time, may be a good candidate for outpatient management, low risk of SBO given clinical presentation. strict return precautions discussed in detail with the patient. follow up with PCP and GI.

## 2023-10-30 NOTE — ED PROVIDER NOTE - IV ALTEPLASE INCLUSION HIDDEN
show Consent (Scalp)/Introductory Paragraph: The rationale for Mohs was explained to the patient and consent was obtained. The risks, benefits and alternatives to therapy were discussed in detail. Specifically, the risks of changes in hair growth pattern secondary to repair, infection, scarring, bleeding, prolonged wound healing, incomplete removal, allergy to anesthesia, nerve injury and recurrence were addressed. Prior to the procedure, the treatment site was clearly identified and confirmed by the patient. All components of Universal Protocol/PAUSE Rule completed.

## 2023-10-30 NOTE — ED PROVIDER NOTE - PATIENT PORTAL LINK FT
You can access the FollowMyHealth Patient Portal offered by [facility] by registering at the following website: [email or web address]. By joining [facility]’s FollowMyHealth portal, you will also be able to view your health information using other applications (apps) compatible with our system.

## 2023-10-30 NOTE — ED PROVIDER NOTE - NSTIMEPROVIDERCAREINITIATE_GEN_ER
30-Oct-2023 12:49 Propranolol Counseling:  I discussed with the patient the risks of propranolol including but not limited to low heart rate, low blood pressure, low blood sugar, restlessness and increased cold sensitivity. They should call the office if they experience any of these side effects.

## 2023-10-30 NOTE — ED ADULT NURSE NOTE - NSFALLUNIVINTERV_ED_ALL_ED
Bed/Stretcher in lowest position, wheels locked, appropriate side rails in place/Physically safe environment - no spills, clutter or unnecessary equipment/Purposeful proactive rounding

## 2023-10-30 NOTE — ED PROVIDER NOTE - ATTENDING CONTRIBUTION TO CARE
33-year-old female history of peptic ulcer disease was already treated for H. pylori, followed by GI last endoscopy 1 year ago, status post globus surgery, now presents with 1 week history of epigastric abdominal discomfort.  With diarrhea not feeling well.  Diarrhea and black stools.  Positive sick contacts at home.    vss, nontoxic but uncomfortable, pink conj, anicteric, MMM, neck supple, CTAB, RRR, equal radial pulses bilat, abd soft/positive epigastric tenderness without peritoneal signs/nd, no cva tend. no calves tend, no edema, no fnd. no rashes.

## 2023-10-30 NOTE — ED PROVIDER NOTE - PROGRESS NOTE DETAILS
Note authored by Dr. Cho: Patient signout to Dr. Snyder to follow up on imaging, reassess and dispo Note authored by Dr. Cho:  patient reassessed.  Patient still with pain.  Still uncomfortable.  We will proceed with cross-sectional imaging

## 2023-10-30 NOTE — ED PROVIDER NOTE - PHYSICAL EXAMINATION
CONSTITUTIONAL:  in no acute distress.   SKIN: warm, dry  HEAD: Normocephalic; atraumatic.  EYES: PERRL, EOMI, normal sclera and conjunctiva   ENT: No nasal discharge; airway clear.  NECK: Supple; non tender.  CARD:  Regular rate and rhythm.   RESP: NO inc WOB   ABD: epigastric discomfort  EXT: Normal ROM.    LYMPH: No acute cervical adenopathy.  NEURO: Alert, oriented, grossly unremarkable  PSYCH: Cooperative, appropriate.

## 2023-10-30 NOTE — ED PROVIDER NOTE - DISCHARGE REVIEW MATERIAL PRESENTED
Same Day Surgery Discharge Instructions for  Sedation and General Anesthesia     It's not unusual to feel dizzy, light-headed or faint for up to 24 hours after surgery or while taking pain medication.  If you have these symptoms: sit for a few minutes before standing and have someone assist you when you get up to walk or use the bathroom.    You should rest and relax for the next 24 hours. We recommend you make arrangements to have an adult stay with you for at least 24 hours after your discharge.  Avoid hazardous and strenuous activity.    DO NOT DRIVE any vehicle or operate mechanical equipment for 24 hours following the end of your surgery.  Even though you may feel normal, your reactions may be affected by the medication you have received.    Do not drink alcoholic beverages for 24 hours following surgery.     Slowly progress to your regular diet as you feel able. It's not unusual to feel nauseated and/or vomit after receiving anesthesia.  If you develop these symptoms, drink clear liquids (apple juice, ginger ale, broth, 7-up, etc. ) until you feel better.  If your nausea and vomiting persists for 24 hours, please notify your surgeon.      All narcotic pain medications, along with inactivity and anesthesia, can cause constipation. Drinking plenty of liquids and increasing fiber intake will help.    For any questions of a medical nature, call your surgeon.    Do not make important decisions for 24 hours.    If you had general anesthesia, you may have a sore throat for a couple of days related to the breathing tube used during surgery.  You may use Cepacol lozenges to help with this discomfort.  If it worsens or if you develop a fever, contact your surgeon.     If you feel your pain is not well managed with the pain medications prescribed by your surgeon, please contact your surgeon's office to let them know so they can address your concerns.       CoVid 19 Information    We want to give you information regarding  Covid. Please consult your primary care provider with any questions you might have.     Patient who have symptoms (cough, fever, or shortness of breath), need to isolate for 7 days from when symptoms started OR 72 hours after fever resolves (without fever reducing medications) AND improvement of respiratory symptoms (whichever is longer).    Isolate yourself at home (in own room/own bathroom if possible)  Do Not allow any visitors  Do Not go to work or school  Do Not go to [employer],  centers, shopping, or other public places.  Do Not shake hands.  Avoid close and intimate contact with others (hugging, kissing).  Follow CDC recommendations for household cleaning of frequently touched services.     After the initial 7 days, continue to isolate yourself from household members as much as possible. To continue decrease the risk of community spread and exposure, you and any members of your household should limit activities in public for 14 days after starting home isolation.     You can reference the following CDC link for helpful home isolation/care tips:  https://www.cdc.gov/coronavirus/2019-ncov/downloads/10Things.pdf    Protect Others:  Cover Your Mouth and Nose with a mask, disposable tissue or wash cloth to avoid spreading germs to others.  Wash your hands and face frequently with soap and water    Call Your Primary Doctor If: Breathing difficulty develops or you become worse.    For more information about COVID19 and options for caring for yourself at home, please visit the CDC website at https://www.cdc.gov/coronavirus/2019-ncov/about/steps-when-sick.html  For more options for care at [facility], please visit our website at [email or web address]      Today you received Toradol, an antiinflammatory medication similar to Ibuprofen.  You should not take other antiinflammatory medication, such as Ibuprofen, Motrin, Advil, Aleve, Naprosyn, etc until 3:30PM       .

## 2023-10-30 NOTE — ED ADULT TRIAGE NOTE - CHIEF COMPLAINT QUOTE
Patient complaining of dark colored stools, abdominal pain and tenderness with vomiting  sine Tuesday. This morning she had a sharp pain in her stomach that is now radiating to the back

## 2023-12-20 ENCOUNTER — APPOINTMENT (OUTPATIENT)
Dept: ORTHOPEDIC SURGERY | Facility: CLINIC | Age: 33
End: 2023-12-20

## 2023-12-21 ENCOUNTER — APPOINTMENT (OUTPATIENT)
Dept: ORTHOPEDIC SURGERY | Facility: CLINIC | Age: 33
End: 2023-12-21

## 2024-01-18 ENCOUNTER — APPOINTMENT (OUTPATIENT)
Dept: ORTHOPEDIC SURGERY | Facility: CLINIC | Age: 34
End: 2024-01-18

## 2024-04-22 ENCOUNTER — APPOINTMENT (OUTPATIENT)
Dept: ORTHOPEDIC SURGERY | Facility: CLINIC | Age: 34
End: 2024-04-22
Payer: MEDICAID

## 2024-04-22 PROCEDURE — 99214 OFFICE O/P EST MOD 30 MIN: CPT

## 2024-04-22 PROCEDURE — 73110 X-RAY EXAM OF WRIST: CPT | Mod: RT

## 2024-04-22 NOTE — ASSESSMENT
[FreeTextEntry1] : Patient comes in with complaints of right wrist and hand pain.  She says that in December she was moving a couch and she felt a pop in her wrist and had swelling in the hand.  She says has been going on for some time.  It causes her significant pain.  She says sometimes her thumb gets super swollen and she cannot use it.  She has multiple braces which have not helped her.  The thumb is the worst as well as the volar wrist.  Right wrist: No gross deformities visualized, tender palpation over basal joint, tender palpation over FCR, tender palpation over TFCC, pain with supination, ulnar deviation, shucking of the DRUJ, pain with basal grind, pain with resisted flexion of the wrist, full range of motion of fingers and wrist, neurovascular intact  X-rays of the wrist 3 views are negative  I believe the patient has multiple problems.  She has a TFCC injury.  She has FCR tendinitis as well as being a little joint synovitis.  It has been going on for almost 4 months now.  She has been resting it.  She has been taking some anti-inflammatories as needed.  She has been bracing it.  Nothing has helped her.  I am recommending an MRI at this time.  I am also going to send anti-inflammatories to the pharmacy for her so she can take them regularly.

## 2024-05-12 ENCOUNTER — APPOINTMENT (OUTPATIENT)
Dept: MRI IMAGING | Facility: CLINIC | Age: 34
End: 2024-05-12
Payer: MEDICAID

## 2024-05-12 PROCEDURE — 73221 MRI JOINT UPR EXTREM W/O DYE: CPT | Mod: RT

## 2024-05-15 ENCOUNTER — APPOINTMENT (OUTPATIENT)
Dept: PAIN MANAGEMENT | Facility: CLINIC | Age: 34
End: 2024-05-15
Payer: MEDICAID

## 2024-05-15 DIAGNOSIS — M54.12 RADICULOPATHY, CERVICAL REGION: ICD-10-CM

## 2024-05-15 PROCEDURE — 99214 OFFICE O/P EST MOD 30 MIN: CPT

## 2024-05-15 RX ORDER — GABAPENTIN 100 MG/1
100 CAPSULE ORAL 3 TIMES DAILY
Qty: 90 | Refills: 0 | Status: ACTIVE | COMMUNITY
Start: 2024-05-15 | End: 1900-01-01

## 2024-05-15 NOTE — HISTORY OF PRESENT ILLNESS
[FreeTextEntry1] : This patient presents to the Pain Clinic complaining of axial low back pain bilaterally, which refers to the bilateral buttock, lateral thigh, posterior thigh, and to the lower leg on the lateral side and occasionally to the lateral lower foot.  The patient experiences axial low back pain constantly all times throughout the day, but her symptoms that refer down the leg occur generally with prolonged sitting bending forward and at night when she lays down in bed.  She has had axial low back pain for a long time about a year or so without any precipitating event, but the pain at that time was more manageable.  Over the last 3 months at least her pain has become more persistent and more severe in nature about an 8 to a 9/10.  The pain now also has a worsening radiating pattern down the legs that is giving the patient pause and concerning her.  She states that the pain in the low back is a constant soreness and achiness as if she has been repeatedly punched in the back.  She says the pain that radiates down the leg is a talking/pulling or a stabbing sensation.  She also reports decreased sensation along the lateral thighs  On both sides.  She has been taking NSAIDs  That was physician directed daily for the last 2 months after seeing an orthopedic doctor with 0 to minimal benefit, but continues to take it because her pain is very much affecting her quality of life and ability to perform her duties at work.  At work she sits down for many hours, which is difficult for her.  The patient also complains of left knee pain that had also started over 6 months ago and likely close to a year ago.  There was no precipitating injury or trauma that she can recall.  The pain had been mild to moderate 4 months but similar to the low back pain has been more persistent and more severe over the last few months.  The patient is taking NSAIDs for pain as needed, but is not very effective at this time.  The pain is described as sharp aching and very limiting when standing and walking.  She denies any catching or clicking or instability.  Patient has not fallen or feel like her knee is going to " give out." the pain is made worse with standing and walking and can be relieved by about 30-40% by sitting down.  Pain is about a 6 out of 10 on the pain scale on average and can be as bad as an 8/10 after prolonged standing or walking.  2/10/2023 Ms Adelaida Webb is a 32 year old female presenting with a new injury. Ms. Webb bent over and pulled out her bed then went to stand up and felt sharp pain on back. The pain radiates down left leg to lower leg but not to the foot. Ms Son rates her current pain 9/10. She said  the pain is worse when sitting, and bending. Standing and walking is somewhat tolerable, but prolonged standing can worsen pain as well. She notes tenderness on the L side.   mri review- very small subligamentous disc herniation central at L5-S1 with slight thecal sac compression xray of the knee unremarkable  TODAY, 5-: Revisit encounter after a long delay.   She is presenting with pain in her neck that started about 2 nights ago. She had her  massage out the area and she noticed worsening pain. She has pain in the neck which refers into the shoulders and into the arms. She has sharp, shooting pains with some numbness and tingling at times in the 1st and 5th digit. She does have weakness in the right upper extremity and has been having trouble with lifting objects. She also complains of stiffness in the neck which is making it difficult to turn her head. She feels like her neck is bruised when she touches the area. She has been managing her pain with Ibuprofen 800 mg. She currently rates her pain at a 7/10 on the pain scale.

## 2024-05-15 NOTE — PHYSICAL EXAM
[de-identified] : Cervical Spine Exam:   Inspection:  erythema (-)   ecchymosis (-)  rashes (-)       Palpation:   Cervical paraspinal mm tenderness: R (-); L(-)  Upper trapezius mm tenderness: R (-); L (-)   Rhomboids tenderness: R (-); L (-)  Scalenes mm tenderness: R (-); L (-)  Occipital Ridge: R (-); L (-)  Supraspinatus mm tenderness: R (-); L (-)   ROM:  limited rom 2/2 to pain   Strength Testing:  Right Left  Deltoid (5/5) (5/5)  Biceps: (4+/5) (5/5)   Triceps: (5/5) (5/5)   Finger Abductors: (5/5) (5/5)   Grasp: (4+/5) (5/5)       Special Testing:  Spurling Test: R (-); L (-)  Facet load test: R (-); L (-)      Lumbar Spine Exam: Inspection: erythema (-) ecchymosis (-) rashes (-)  Palpation: Midline lumbar tenderness:             (-) paraspinal tenderness:                  L (+) ; R (+) sciatic nerve tenderness :             L (+) ; R (+) SI joint tenderness:                        L (+) ; R (+) GTB tenderness:                            L (-);  R (-)  normal ROM, albeit painful in lumbar flexion  Strength: 5/5 throughout all muscle groups of the lower extremity                                     Right       Left    Hip Flexion:                (5/5)       (5/5) Quadriceps:               (5/5)       (5/5) Hamstrings:                (5/5)       (5/5) Ankle Dorsiflexion:     (5/5)       (5/5) EHL:                           (5/5)       (5/5) Ankle Plantarflexion:  (5/5)       (5/5)  Special Tests: SLR:                           R (-) ; L (-) Facet loading:            R (-) ; L (-) ELY test:               R (-) ; L (-)  Neurologic: Reflexes normal and symmetric   Gait: non- antalgic gait ambulates w/o assistive device

## 2024-05-15 NOTE — DISCUSSION/SUMMARY
[de-identified] : A discussion regarding available pain management treatment options occurred with the patient.  These included interventional, rehabilitative, pharmacological, and alternative modalities. We will proceed with the following:  Interventional treatment options: - None indicated at this time.   Rehabilitative options: 1. Start physical therapy for the cervical spine.  Physical therapy of the cervical spine 2-3 times a week for 4-8 weeks stressing a home exercise program of walking, shoulder griddle strengthening,  swimming, elliptical , recumbent bike, Don chi and Yoga. Use things that heat like hot shower or icy heat before rehab, exercising and at the beginning of the day, and ice (ice in a bag never directly on the skin) after activity and at the end of the day.  Imagin. The patient has been having persistent cervical radicular pain. At this point we decided to proceed with an MRI of the cervical spine without contrast to evaluate the pathology and give the patient treatment options to help with the pain. Potential treatment options such as further conservative therapy, injections, and surgery were also briefly discussed.   Medications: 1. Ordered Gabapentin 100 mg q8h as needed.   We are going to start gabapentin. Potential side effects were discussed which included dizziness, sedation, and pedal edema to name a few. If the patient cannot tolerate these side effects should they occur, then they will stop the medication. If the patient experiences sedation, they understand that they should not drive. The usage of the medication was discussed and the patient understands that it is an anti-epileptic medication used for neuropathic pain and that the pain relief from this medication will likely be subtle, and that hopefully in combination with the other treatments we are offering we will be able to get some additive relief.   - Follow up in 4-6 weeks.   I Carly Bustos attest that this documentation has been prepared under the direction and in the presence of provider Dr. Harsha Darnell.  The documentation recorded by the scribe in my presence, accurately reflects the service I personally performed, and the decisions made by me with my edits as appropriate.   Harsha Darnell, DO

## 2024-06-09 ENCOUNTER — APPOINTMENT (OUTPATIENT)
Dept: MRI IMAGING | Facility: CLINIC | Age: 34
End: 2024-06-09

## 2024-06-11 ENCOUNTER — APPOINTMENT (OUTPATIENT)
Dept: ORTHOPEDIC SURGERY | Facility: CLINIC | Age: 34
End: 2024-06-11
Payer: MEDICAID

## 2024-06-11 DIAGNOSIS — M65.841 OTHER SYNOVITIS AND TENOSYNOVITIS, RIGHT HAND: ICD-10-CM

## 2024-06-11 DIAGNOSIS — S63.591A OTHER SPECIFIED SPRAIN OF RIGHT WRIST, INITIAL ENCOUNTER: ICD-10-CM

## 2024-06-11 PROCEDURE — 99213 OFFICE O/P EST LOW 20 MIN: CPT | Mod: 25

## 2024-06-11 PROCEDURE — 20604 DRAIN/INJ JOINT/BURSA W/US: CPT

## 2024-06-11 NOTE — ASSESSMENT
[FreeTextEntry1] : Patient comes in with complaints of right wrist and hand pain.  She says that she is having pain on the ulnar aspect the wrist she is also having pain in the base of the thumb.  She says has been going on for some time.  She has been using the brace but she finds that the metal rubs against certain area in the wrist that bothers her.  She has other braces at home that she also can use as well.  Right wrist: No gross deformities visualized, tender palpation over basal joint, tender palpation over TFCC, pain with supination, ulnar deviation, shucking of the DRUJ, pain with basal grind, pain with resisted flexion of the wrist, full range of motion of fingers and wrist, neurovascular intact  The patient does not seem to be significantly better since her last visit.  She says there are certain things that cause her to get worse.  She has pain on the ulnar aspect of the wrist and the base of the thumb.  Today patient states that the base of the thumb is the worst of the pain.  And opted for an injection to the basal joint.  I am hoping this will help decrease the inflammation.  She will continue with the anti-inflammatories and the bracing as well.  Patient opted for an injection to the right basal joint.  After a discussion of the risks, benefits and alternatives along with the expectations, the patient was amenable to injection.  The indication for injection is pain, inflammation and radiographically confirmed arthritis. Anesthesia: ethyl chloride sprayed topically Dexamethasone: An injection of 0.9cc (4mg/mL) Lidocaine: An injection of Lidocaine 1% 0.1 cc   Patient has tried OTC's including aspirin, Ibuprofen, Aleve etc or prescription NSAIDS, and/or exercises at home and/ or physical therapy without satisfactory response. After verbal consent using sterile preparation and technique. The risks, benefits, and alternatives to cortisone injection were explained in full to the patient. Risks outlined include but are not limited to infection, sepsis, bleeding, scarring, skin discoloration, temporary increase in pain, syncopal episode, failure to resolve symptoms, allergic reaction, symptom recurrence, and elevation of blood sugar in diabetics. Patient understood the risks. All questions were answered. After discussion of options, patient requested an injection. Oral informed consent was obtained and sterile prep was done of the injection site. Sterile technique was utilized for the procedure including the preparation of the solutions used for the injection. Prep with betadine  and alcohol locally to site.  Using ultrasound guidance, the joint was visualized.  The area was then sprayed with ethyl chloride and an injection was given.  Patient tolerated the procedure well. Advised to ice the injection site this evening.

## 2024-06-12 ENCOUNTER — APPOINTMENT (OUTPATIENT)
Dept: PAIN MANAGEMENT | Facility: CLINIC | Age: 34
End: 2024-06-12

## 2024-06-12 ENCOUNTER — RX RENEWAL (OUTPATIENT)
Age: 34
End: 2024-06-12

## 2024-06-12 RX ORDER — IBUPROFEN 800 MG/1
800 TABLET, FILM COATED ORAL
Qty: 90 | Refills: 0 | Status: ACTIVE | COMMUNITY
Start: 2024-06-12 | End: 1900-01-01

## 2024-06-12 RX ORDER — IBUPROFEN 800 MG/1
800 TABLET ORAL 3 TIMES DAILY
Qty: 90 | Refills: 0 | Status: ACTIVE | COMMUNITY
Start: 2024-04-22

## 2024-08-15 ENCOUNTER — APPOINTMENT (OUTPATIENT)
Age: 34
End: 2024-08-15

## 2024-08-15 ENCOUNTER — OUTPATIENT (OUTPATIENT)
Dept: OUTPATIENT SERVICES | Facility: HOSPITAL | Age: 34
LOS: 1 days | End: 2024-08-15
Payer: MEDICAID

## 2024-08-15 ENCOUNTER — LABORATORY RESULT (OUTPATIENT)
Age: 34
End: 2024-08-15

## 2024-08-15 DIAGNOSIS — D50.0 IRON DEFICIENCY ANEMIA SECONDARY TO BLOOD LOSS (CHRONIC): ICD-10-CM

## 2024-08-15 DIAGNOSIS — Z90.49 ACQUIRED ABSENCE OF OTHER SPECIFIED PARTS OF DIGESTIVE TRACT: Chronic | ICD-10-CM

## 2024-08-15 DIAGNOSIS — Z98.890 OTHER SPECIFIED POSTPROCEDURAL STATES: Chronic | ICD-10-CM

## 2024-08-15 LAB
HCT VFR BLD CALC: 35.8 %
HGB BLD-MCNC: 12.6 G/DL
MCHC RBC-ENTMCNC: 31.8 PG
MCHC RBC-ENTMCNC: 35.2 G/DL
MCV RBC AUTO: 90.4 FL
PLATELET # BLD AUTO: 267 K/UL
PMV BLD: 10.3 FL
RBC # BLD: 3.96 M/UL
RBC # FLD: 12.2 %
WBC # FLD AUTO: 10.12 K/UL

## 2024-08-15 PROCEDURE — 83540 ASSAY OF IRON: CPT

## 2024-08-15 PROCEDURE — 85027 COMPLETE CBC AUTOMATED: CPT

## 2024-08-15 PROCEDURE — 99213 OFFICE O/P EST LOW 20 MIN: CPT

## 2024-08-15 PROCEDURE — 83550 IRON BINDING TEST: CPT

## 2024-08-15 PROCEDURE — 82728 ASSAY OF FERRITIN: CPT

## 2024-08-15 NOTE — HISTORY OF PRESENT ILLNESS
[de-identified] : CC: I have anemia \par  \par  This is a 32 year old female with history of back pain, Migraines, sciatica, and anemia. She has been folowed by Neurolology for possible stroke, she was seen by rhematology in the past as well.\par  \par  She had blood work done on November 25, 2022 that showed microcytic anemia with hemoglobin 7.9 platelets of 436.  She has been anemic for quite some time had a hemoglobin of 9.9 in 2017 but recently became microcytic. She had ended up having a blood transfusion she had one unit. \par  \par  For months ago she had an ectopic pregnancy and needed a D and C and needed blood transfusion at that time she was in 5 range. \par  \par  No iron studies available for review\par  \par  She has been on oral iron 3 times a day since about one week. \par  \par  She has 4 children and was pregnant 6 times, her youngest is 22 months old. She had high BP during pregnancy. Had bad nausea and vomiting. kids are 4-6 years apart. Her periods are very heavy in past, they lasted for 7 days, uses a tampone and a pad every 2 hours, and she had clots.  recently  normal \par  \par  She has gastritis and pending Endoscopy she had PUD as well. She hasd H pylori as well that was iradicated.  She takes protonix. \par  \par  denies any bleeding issues, had no issues with bleeding including dental extractions or gallbader surgery, all vagianl deliveries. \par  \par  Mom had issues with internal bleeding. \par  \par  She used to have an IUD  in past but it fell out.  She is now on OCP low estogen. \par  \par  Mom also had ovarian cancer and Mr. Webb tells me she was checked for BRCA and was negative\par  \par  She has GI upset with oral iron [de-identified] : 04/12/2023\par  Reports increasing fatigue and significantly heavy last menstrual period.\par  \par  07/12/2023\par  she reports feeling better S/P iron IV.

## 2024-08-15 NOTE — ASSESSMENT
[FreeTextEntry1] : # Chronic anemia likely secondary to iron deficiency from menstrual blood loss with possible component of malabsorption from previous H. pylori, also blood loss from peptic ulcer disease, as well as malabsorption from PPI use as well as pregnancy demand in the past. - Given the severity of her anemia also some abdominal discomfort from oral iron and may be possible malabsorption to iron from PPI use recommended intravenous iron and after discussion decided to proceed with Feraheme 1020 mg 1 dose with premedication of hydrocortisone 100 mg.  Risk of anaphylaxis discussed.  If insurance does not pay for Feraheme will offer Venofer - She is pending an endoscopy with her gastroenterologist. - 05/2023 improved S/P Feraheme 1020 mg IV 1 dose with premedication of Solu-Cortef 100 mg IV and Tylenol 650 mg PO.  08/15/2024 she is on Ozemtick.  PLAN: - continue observation. - Lab work today: CBC ferritin and iron studies. RTC in 3 months with CBC ferritin and iron studies

## 2024-08-16 ENCOUNTER — APPOINTMENT (OUTPATIENT)
Age: 34
End: 2024-08-16

## 2024-08-16 DIAGNOSIS — D50.0 IRON DEFICIENCY ANEMIA SECONDARY TO BLOOD LOSS (CHRONIC): ICD-10-CM

## 2024-08-20 LAB
FERRITIN SERPL-MCNC: 118 NG/ML
IRON SATN MFR SERPL: 26 %
IRON SERPL-MCNC: 82 UG/DL
TIBC SERPL-MCNC: 321 UG/DL
UIBC SERPL-MCNC: 239 UG/DL

## 2024-09-10 ENCOUNTER — APPOINTMENT (OUTPATIENT)
Dept: PAIN MANAGEMENT | Facility: CLINIC | Age: 34
End: 2024-09-10
Payer: MEDICAID

## 2024-09-10 DIAGNOSIS — M54.12 RADICULOPATHY, CERVICAL REGION: ICD-10-CM

## 2024-09-10 PROCEDURE — 99214 OFFICE O/P EST MOD 30 MIN: CPT

## 2024-09-27 ENCOUNTER — APPOINTMENT (OUTPATIENT)
Dept: PAIN MANAGEMENT | Facility: CLINIC | Age: 34
End: 2024-09-27

## 2024-10-07 ENCOUNTER — APPOINTMENT (OUTPATIENT)
Dept: PAIN MANAGEMENT | Facility: CLINIC | Age: 34
End: 2024-10-07

## 2025-03-11 ENCOUNTER — APPOINTMENT (OUTPATIENT)
Dept: NEUROLOGY | Facility: CLINIC | Age: 35
End: 2025-03-11
Payer: MEDICAID

## 2025-03-11 VITALS
BODY MASS INDEX: 30.81 KG/M2 | DIASTOLIC BLOOD PRESSURE: 77 MMHG | HEART RATE: 103 BPM | HEIGHT: 69 IN | SYSTOLIC BLOOD PRESSURE: 117 MMHG | WEIGHT: 208 LBS

## 2025-03-11 DIAGNOSIS — G62.9 POLYNEUROPATHY, UNSPECIFIED: ICD-10-CM

## 2025-03-11 DIAGNOSIS — G47.30 SLEEP APNEA, UNSPECIFIED: ICD-10-CM

## 2025-03-11 DIAGNOSIS — G93.5 COMPRESSION OF BRAIN: ICD-10-CM

## 2025-03-11 DIAGNOSIS — G43.709 CHRONIC MIGRAINE W/OUT AURA, NOT INTRACTABLE, W/OUT STATUS MIGRAINOSUS: ICD-10-CM

## 2025-03-11 DIAGNOSIS — R41.89 OTHER SYMPTOMS AND SIGNS INVOLVING COGNITIVE FUNCTIONS AND AWARENESS: ICD-10-CM

## 2025-03-11 PROCEDURE — 99215 OFFICE O/P EST HI 40 MIN: CPT

## 2025-03-11 PROCEDURE — G2211 COMPLEX E/M VISIT ADD ON: CPT | Mod: NC

## 2025-03-24 ENCOUNTER — APPOINTMENT (OUTPATIENT)
Dept: NEUROLOGY | Facility: CLINIC | Age: 35
End: 2025-03-24
Payer: MEDICAID

## 2025-03-24 PROCEDURE — 95911 NRV CNDJ TEST 9-10 STUDIES: CPT

## 2025-03-24 PROCEDURE — 95886 MUSC TEST DONE W/N TEST COMP: CPT

## 2025-03-26 ENCOUNTER — APPOINTMENT (OUTPATIENT)
Dept: MRI IMAGING | Facility: CLINIC | Age: 35
End: 2025-03-26
Payer: MEDICAID

## 2025-03-26 PROCEDURE — 70551 MRI BRAIN STEM W/O DYE: CPT

## 2025-03-31 ENCOUNTER — APPOINTMENT (OUTPATIENT)
Dept: NEUROLOGY | Facility: CLINIC | Age: 35
End: 2025-03-31

## 2025-04-02 ENCOUNTER — APPOINTMENT (OUTPATIENT)
Dept: NEUROLOGY | Facility: CLINIC | Age: 35
End: 2025-04-02

## 2025-04-09 ENCOUNTER — APPOINTMENT (OUTPATIENT)
Dept: NEUROLOGY | Facility: CLINIC | Age: 35
End: 2025-04-09
Payer: MEDICAID

## 2025-04-09 PROCEDURE — 95816 EEG AWAKE AND DROWSY: CPT

## 2025-04-23 ENCOUNTER — APPOINTMENT (OUTPATIENT)
Dept: NEUROLOGY | Facility: CLINIC | Age: 35
End: 2025-04-23
Payer: MEDICAID

## 2025-04-23 PROCEDURE — 95886 MUSC TEST DONE W/N TEST COMP: CPT

## 2025-04-23 PROCEDURE — 95912 NRV CNDJ TEST 11-12 STUDIES: CPT

## 2025-05-20 ENCOUNTER — APPOINTMENT (OUTPATIENT)
Dept: PAIN MANAGEMENT | Facility: CLINIC | Age: 35
End: 2025-05-20
Payer: MEDICAID

## 2025-05-20 DIAGNOSIS — M54.16 RADICULOPATHY, LUMBAR REGION: ICD-10-CM

## 2025-05-20 DIAGNOSIS — M51.26 OTHER INTERVERTEBRAL DISC DISPLACEMENT, LUMBAR REGION: ICD-10-CM

## 2025-05-20 PROCEDURE — 99214 OFFICE O/P EST MOD 30 MIN: CPT | Mod: 25

## 2025-05-20 PROCEDURE — 20552 NJX 1/MLT TRIGGER POINT 1/2: CPT

## 2025-07-22 NOTE — OB RN DELIVERY SUMMARY - [facility]_OBGYN_ALL_OB
In an effort to ensure that our patients [employer], a Team Member has reviewed your chart and identified an opportunity to provide the best care possible. An attempt was made to discuss or schedule due or overdue Preventive or Chronic Condition care.Care Gaps identified: Breast Cancer Screening.    The Outcome was Contact was not made, left message. We are attempting to schedule a breast cancer screening. If you have any questions or need help with scheduling, contact your primary care provider..   Type of Appointment needed: breast cancer screening   None

## 2025-08-08 ENCOUNTER — APPOINTMENT (OUTPATIENT)
Dept: RADIOLOGY | Facility: CLINIC | Age: 35
End: 2025-08-08

## 2025-08-08 ENCOUNTER — APPOINTMENT (OUTPATIENT)
Dept: PAIN MANAGEMENT | Facility: CLINIC | Age: 35
End: 2025-08-08
Payer: MEDICAID

## 2025-08-08 DIAGNOSIS — M25.512 PAIN IN RIGHT SHOULDER: ICD-10-CM

## 2025-08-08 DIAGNOSIS — M25.511 PAIN IN RIGHT SHOULDER: ICD-10-CM

## 2025-08-08 PROCEDURE — 72050 X-RAY EXAM NECK SPINE 4/5VWS: CPT

## 2025-08-08 PROCEDURE — 73030 X-RAY EXAM OF SHOULDER: CPT | Mod: 50

## 2025-08-08 PROCEDURE — 99214 OFFICE O/P EST MOD 30 MIN: CPT

## 2025-08-08 RX ORDER — PREGABALIN 75 MG/1
75 CAPSULE ORAL
Qty: 60 | Refills: 2 | Status: ACTIVE | COMMUNITY
Start: 2025-08-08 | End: 1900-01-01

## 2025-08-29 ENCOUNTER — APPOINTMENT (OUTPATIENT)
Dept: NEUROLOGY | Facility: CLINIC | Age: 35
End: 2025-08-29

## 2025-09-19 ENCOUNTER — APPOINTMENT (OUTPATIENT)
Dept: PAIN MANAGEMENT | Facility: CLINIC | Age: 35
End: 2025-09-19